# Patient Record
Sex: FEMALE | Race: ASIAN | NOT HISPANIC OR LATINO
[De-identification: names, ages, dates, MRNs, and addresses within clinical notes are randomized per-mention and may not be internally consistent; named-entity substitution may affect disease eponyms.]

---

## 2017-07-17 ENCOUNTER — APPOINTMENT (OUTPATIENT)
Dept: OPHTHALMOLOGY | Facility: CLINIC | Age: 62
End: 2017-07-17

## 2017-07-17 DIAGNOSIS — H17.9 UNSPECIFIED CORNEAL SCAR AND OPACITY: ICD-10-CM

## 2017-07-17 DIAGNOSIS — H26.9 UNSPECIFIED CATARACT: ICD-10-CM

## 2017-07-17 DIAGNOSIS — H16.302: ICD-10-CM

## 2017-07-17 DIAGNOSIS — H04.123 DRY EYE SYNDROME OF BILATERAL LACRIMAL GLANDS: ICD-10-CM

## 2017-07-17 DIAGNOSIS — H53.002 UNSPECIFIED AMBLYOPIA, LEFT EYE: ICD-10-CM

## 2018-10-03 ENCOUNTER — OUTPATIENT (OUTPATIENT)
Dept: OUTPATIENT SERVICES | Facility: HOSPITAL | Age: 63
LOS: 1 days | End: 2018-10-03
Payer: COMMERCIAL

## 2018-10-03 VITALS
SYSTOLIC BLOOD PRESSURE: 141 MMHG | WEIGHT: 164.91 LBS | HEART RATE: 93 BPM | DIASTOLIC BLOOD PRESSURE: 90 MMHG | TEMPERATURE: 98 F | HEIGHT: 63 IN | OXYGEN SATURATION: 97 %

## 2018-10-03 DIAGNOSIS — R94.39 ABNORMAL RESULT OF OTHER CARDIOVASCULAR FUNCTION STUDY: ICD-10-CM

## 2018-10-03 LAB
ANION GAP SERPL CALC-SCNC: 12 MMOL/L — SIGNIFICANT CHANGE UP (ref 5–17)
BUN SERPL-MCNC: 13 MG/DL — SIGNIFICANT CHANGE UP (ref 7–23)
CALCIUM SERPL-MCNC: 10 MG/DL — SIGNIFICANT CHANGE UP (ref 8.4–10.5)
CHLORIDE SERPL-SCNC: 107 MMOL/L — SIGNIFICANT CHANGE UP (ref 96–108)
CO2 SERPL-SCNC: 22 MMOL/L — SIGNIFICANT CHANGE UP (ref 22–31)
CREAT SERPL-MCNC: 0.62 MG/DL — SIGNIFICANT CHANGE UP (ref 0.5–1.3)
GLUCOSE SERPL-MCNC: 104 MG/DL — HIGH (ref 70–99)
HCT VFR BLD CALC: 40.5 % — SIGNIFICANT CHANGE UP (ref 34.5–45)
HGB BLD-MCNC: 14.1 G/DL — SIGNIFICANT CHANGE UP (ref 11.5–15.5)
MCHC RBC-ENTMCNC: 29.6 PG — SIGNIFICANT CHANGE UP (ref 27–34)
MCHC RBC-ENTMCNC: 34.8 GM/DL — SIGNIFICANT CHANGE UP (ref 32–36)
MCV RBC AUTO: 85 FL — SIGNIFICANT CHANGE UP (ref 80–100)
PLATELET # BLD AUTO: 241 K/UL — SIGNIFICANT CHANGE UP (ref 150–400)
POTASSIUM SERPL-MCNC: 4.1 MMOL/L — SIGNIFICANT CHANGE UP (ref 3.5–5.3)
POTASSIUM SERPL-SCNC: 4.1 MMOL/L — SIGNIFICANT CHANGE UP (ref 3.5–5.3)
RBC # BLD: 4.76 M/UL — SIGNIFICANT CHANGE UP (ref 3.8–5.2)
RBC # FLD: 11.5 % — SIGNIFICANT CHANGE UP (ref 10.3–14.5)
SODIUM SERPL-SCNC: 141 MMOL/L — SIGNIFICANT CHANGE UP (ref 135–145)
WBC # BLD: 6.7 K/UL — SIGNIFICANT CHANGE UP (ref 3.8–10.5)
WBC # FLD AUTO: 6.7 K/UL — SIGNIFICANT CHANGE UP (ref 3.8–10.5)

## 2018-10-03 PROCEDURE — 99152 MOD SED SAME PHYS/QHP 5/>YRS: CPT | Mod: GC

## 2018-10-03 PROCEDURE — C1887: CPT

## 2018-10-03 PROCEDURE — 93010 ELECTROCARDIOGRAM REPORT: CPT

## 2018-10-03 PROCEDURE — 93458 L HRT ARTERY/VENTRICLE ANGIO: CPT

## 2018-10-03 PROCEDURE — 93005 ELECTROCARDIOGRAM TRACING: CPT

## 2018-10-03 PROCEDURE — 80048 BASIC METABOLIC PNL TOTAL CA: CPT

## 2018-10-03 PROCEDURE — 99203 OFFICE O/P NEW LOW 30 MIN: CPT

## 2018-10-03 PROCEDURE — 93458 L HRT ARTERY/VENTRICLE ANGIO: CPT | Mod: 26,GC

## 2018-10-03 PROCEDURE — 85027 COMPLETE CBC AUTOMATED: CPT

## 2018-10-03 PROCEDURE — C1894: CPT

## 2018-10-03 PROCEDURE — C1769: CPT

## 2018-10-03 PROCEDURE — 99152 MOD SED SAME PHYS/QHP 5/>YRS: CPT

## 2018-10-03 RX ORDER — PANTOPRAZOLE SODIUM 20 MG/1
1 TABLET, DELAYED RELEASE ORAL
Qty: 0 | Refills: 0 | COMMUNITY

## 2018-10-03 RX ORDER — MESALAMINE 400 MG
0 TABLET, DELAYED RELEASE (ENTERIC COATED) ORAL
Qty: 0 | Refills: 0 | COMMUNITY

## 2018-10-03 RX ORDER — METOPROLOL TARTRATE 50 MG
1 TABLET ORAL
Qty: 0 | Refills: 0 | COMMUNITY

## 2018-10-03 NOTE — H&P CARDIOLOGY - HISTORY OF PRESENT ILLNESS
This is a 63 yo female with history of ulcerative colitis, HLD presents for outpatient cardiac cath possible intervention to evaluate exertional chest pain non radiating with walking and chores.  EF was 62% on Echo from 8/2018 with mild MR.  Pt also reports abnormal nuclear stress test 1 year ago, however did not go for further evaluation due to anxiety as her mother suffered a CVA post coronary angiogram. She also has concerns regarding anticoagulation with her ulcerative colitis.       Cardio- Dr. Dockery- [facility]

## 2018-10-04 PROBLEM — M17.0 PRIMARY OSTEOARTHRITIS OF BOTH KNEES: Status: ACTIVE | Noted: 2018-10-04

## 2018-10-08 ENCOUNTER — APPOINTMENT (OUTPATIENT)
Dept: ORTHOPEDIC SURGERY | Facility: CLINIC | Age: 63
End: 2018-10-08

## 2018-10-08 DIAGNOSIS — M17.0 BILATERAL PRIMARY OSTEOARTHRITIS OF KNEE: ICD-10-CM

## 2018-10-10 PROBLEM — K51.918 ULCERATIVE COLITIS, UNSPECIFIED WITH OTHER COMPLICATION: Chronic | Status: ACTIVE | Noted: 2018-10-03

## 2018-10-10 PROBLEM — E78.2 MIXED HYPERLIPIDEMIA: Chronic | Status: ACTIVE | Noted: 2018-10-03

## 2018-10-19 ENCOUNTER — APPOINTMENT (OUTPATIENT)
Dept: ORTHOPEDIC SURGERY | Facility: CLINIC | Age: 63
End: 2018-10-19

## 2020-09-30 ENCOUNTER — APPOINTMENT (OUTPATIENT)
Dept: VASCULAR SURGERY | Facility: CLINIC | Age: 65
End: 2020-09-30
Payer: COMMERCIAL

## 2020-09-30 VITALS
WEIGHT: 170 LBS | TEMPERATURE: 97.5 F | HEIGHT: 63 IN | BODY MASS INDEX: 30.12 KG/M2 | DIASTOLIC BLOOD PRESSURE: 74 MMHG | SYSTOLIC BLOOD PRESSURE: 117 MMHG | HEART RATE: 97 BPM

## 2020-09-30 DIAGNOSIS — M79.2 NEURALGIA AND NEURITIS, UNSPECIFIED: ICD-10-CM

## 2020-09-30 PROCEDURE — 93923 UPR/LXTR ART STDY 3+ LVLS: CPT

## 2020-09-30 PROCEDURE — 99243 OFF/OP CNSLTJ NEW/EST LOW 30: CPT

## 2020-12-15 ENCOUNTER — APPOINTMENT (OUTPATIENT)
Dept: OPHTHALMOLOGY | Facility: CLINIC | Age: 65
End: 2020-12-15

## 2021-02-27 ENCOUNTER — APPOINTMENT (OUTPATIENT)
Dept: DISASTER EMERGENCY | Facility: CLINIC | Age: 66
End: 2021-02-27

## 2021-02-27 ENCOUNTER — LABORATORY RESULT (OUTPATIENT)
Age: 66
End: 2021-02-27

## 2021-03-02 ENCOUNTER — APPOINTMENT (OUTPATIENT)
Dept: PULMONOLOGY | Facility: CLINIC | Age: 66
End: 2021-03-02
Payer: COMMERCIAL

## 2021-03-02 VITALS
OXYGEN SATURATION: 98 % | HEIGHT: 62 IN | BODY MASS INDEX: 30.36 KG/M2 | WEIGHT: 165 LBS | TEMPERATURE: 97.9 F | HEART RATE: 98 BPM

## 2021-03-02 VITALS
SYSTOLIC BLOOD PRESSURE: 116 MMHG | DIASTOLIC BLOOD PRESSURE: 76 MMHG | HEART RATE: 96 BPM | OXYGEN SATURATION: 99 % | TEMPERATURE: 97.3 F

## 2021-03-02 DIAGNOSIS — K51.90 ULCERATIVE COLITIS, UNSPECIFIED, W/OUT COMPLICATIONS: ICD-10-CM

## 2021-03-02 PROCEDURE — 99203 OFFICE O/P NEW LOW 30 MIN: CPT | Mod: 25

## 2021-03-02 PROCEDURE — 94726 PLETHYSMOGRAPHY LUNG VOLUMES: CPT

## 2021-03-02 PROCEDURE — 94010 BREATHING CAPACITY TEST: CPT

## 2021-03-02 PROCEDURE — 99072 ADDL SUPL MATRL&STAF TM PHE: CPT

## 2021-03-02 PROCEDURE — ZZZZZ: CPT

## 2021-03-02 PROCEDURE — 94729 DIFFUSING CAPACITY: CPT

## 2021-03-02 NOTE — PHYSICAL EXAM
[No Acute Distress] : no acute distress [Normal Appearance] : normal appearance [No Neck Mass] : no neck mass [Normal Rate/Rhythm] : normal rate/rhythm [Normal S1, S2] : normal s1, s2 [No Murmurs] : no murmurs [No Resp Distress] : no resp distress [Clear to Auscultation Bilaterally] : clear to auscultation bilaterally [No Abnormalities] : no abnormalities [No Clubbing] : no clubbing [No Edema] : no edema [Normal Color/ Pigmentation] : normal color/ pigmentation [No Focal Deficits] : no focal deficits [Oriented x3] : oriented x3 [Normal Affect] : normal affect

## 2021-03-02 NOTE — HISTORY OF PRESENT ILLNESS
[TextBox_4] : 65 year old woman with ulcerative colitis.  who is on mesalamine.  She denies all other PMH.  Son is present translating for her.  I recently diagnosed her  with active TB.  She had quant gold sent after that exposure and it was positive.  CXR taken on 2/16/21 was negative without any evidence of active TB.see scanned report.\par \par she feels well and denies all symptoms. Denies cough, sputum productionj weight loss or night sweats.  is being treated for TB and monitored by JAZLYN.

## 2021-03-02 NOTE — ASSESSMENT
[FreeTextEntry1] : 65 year old woman with history of ulcerative colitis on mesalamine, with latent TB infection.  Her  was recently diagnosed with active TB and is my patient.  She has no symptoms.\par \par Quantiferon gold was positive- son will send me report.\par CXR was clear without I/E\par \par ON PE VSS and lugns are clear.\par \par PFTs are normal\par \par IMpression:  Latent TB infection, with high risk exposure ( )\par \par Plan:\par 1- Rifampin 600mg daily for 4 months.  \par 2- check cbc and  comprehensive metabolic panel today and monthly.\par 3- advised not to start medication until I review her labs.\par F/U in one monht.

## 2021-03-04 LAB
ALBUMIN SERPL ELPH-MCNC: 4.5 G/DL
ALP BLD-CCNC: 116 U/L
ALT SERPL-CCNC: 13 U/L
ANION GAP SERPL CALC-SCNC: 11 MMOL/L
AST SERPL-CCNC: 18 U/L
BASOPHILS # BLD AUTO: 0.06 K/UL
BASOPHILS NFR BLD AUTO: 0.7 %
BILIRUB SERPL-MCNC: <0.2 MG/DL
BUN SERPL-MCNC: 17 MG/DL
CALCIUM SERPL-MCNC: 9.9 MG/DL
CHLORIDE SERPL-SCNC: 106 MMOL/L
CO2 SERPL-SCNC: 24 MMOL/L
CREAT SERPL-MCNC: 0.7 MG/DL
EOSINOPHIL # BLD AUTO: 0.19 K/UL
EOSINOPHIL NFR BLD AUTO: 2.2 %
GLUCOSE SERPL-MCNC: 108 MG/DL
HCT VFR BLD CALC: 41.9 %
HGB BLD-MCNC: 13.4 G/DL
IMM GRANULOCYTES NFR BLD AUTO: 0.2 %
LYMPHOCYTES # BLD AUTO: 2.68 K/UL
LYMPHOCYTES NFR BLD AUTO: 30.7 %
MAN DIFF?: NORMAL
MCHC RBC-ENTMCNC: 28.7 PG
MCHC RBC-ENTMCNC: 32 GM/DL
MCV RBC AUTO: 89.7 FL
MONOCYTES # BLD AUTO: 0.71 K/UL
MONOCYTES NFR BLD AUTO: 8.1 %
NEUTROPHILS # BLD AUTO: 5.07 K/UL
NEUTROPHILS NFR BLD AUTO: 58.1 %
PLATELET # BLD AUTO: 331 K/UL
POTASSIUM SERPL-SCNC: 4.3 MMOL/L
PROT SERPL-MCNC: 7.5 G/DL
RBC # BLD: 4.67 M/UL
RBC # FLD: 12.2 %
SODIUM SERPL-SCNC: 141 MMOL/L
WBC # FLD AUTO: 8.73 K/UL

## 2021-03-26 ENCOUNTER — RX RENEWAL (OUTPATIENT)
Age: 66
End: 2021-03-26

## 2021-04-08 ENCOUNTER — APPOINTMENT (OUTPATIENT)
Dept: PULMONOLOGY | Facility: CLINIC | Age: 66
End: 2021-04-08
Payer: COMMERCIAL

## 2021-04-08 VITALS
HEART RATE: 94 BPM | DIASTOLIC BLOOD PRESSURE: 77 MMHG | OXYGEN SATURATION: 98 % | TEMPERATURE: 96.7 F | WEIGHT: 165 LBS | BODY MASS INDEX: 30.36 KG/M2 | SYSTOLIC BLOOD PRESSURE: 109 MMHG | HEIGHT: 62 IN

## 2021-04-08 PROCEDURE — 99072 ADDL SUPL MATRL&STAF TM PHE: CPT

## 2021-04-08 PROCEDURE — 99213 OFFICE O/P EST LOW 20 MIN: CPT

## 2021-04-08 NOTE — HISTORY OF PRESENT ILLNESS
[TextBox_4] : 65 year old woman with ulcerative colitis.  who is on mesalamine.  She denies all other PMH.  Son is present translating for her.  I recently diagnosed her  with active TB.  She had quant gold sent after that exposure and it was positive.  CXR taken on 2/16/21 was negative without any evidence of active TB.see scanned report.\par \par she was started on Rifampin in early March.  She refilled her medication on 3/26/21 \par \par Returns for follow up today.  Accompanied by .  She feels well.  Denies any nausea, vomiting abdominal pain.  no fever sweats, slight occasional cough.

## 2021-04-08 NOTE — ASSESSMENT
[FreeTextEntry1] : 65 year old woman with history of ulcerative colitis on mesalamine, with latent TB infection.  Her  was recently diagnosed with active TB and is my patient.  She has no symptoms.\par Quantiferon gold was positive.\par CXR was clear without I/E\par Returns for follow up.  She feels well on the Rifampin.  denies nausea or vomiting. \par \par ON PE VSS and lugns are clear. Abdomen is soft and nontender.  \par \par PFTs are normal\par \par IMpression:  Latent TB infection, with high risk exposure ( )Tolerating treatment with rifampin for LTBI.  Will take for total of 4 months.  \par \par Plan:\par 1- Continue Rifampin 600mg.  \par 2- check cbc and  comprehensive metabolic panel today and monthly.\par 3- I prescribed additional month (May).  Will have labs checked after 5/15/- after her FAST is over.\par 4- Follow up in June.

## 2021-04-08 NOTE — PHYSICAL EXAM
[No Acute Distress] : no acute distress [Normal Oropharynx] : normal oropharynx [Normal Appearance] : normal appearance [No Neck Mass] : no neck mass [Normal Rate/Rhythm] : normal rate/rhythm [Normal S1, S2] : normal s1, s2 [No Murmurs] : no murmurs [No Resp Distress] : no resp distress [Clear to Auscultation Bilaterally] : clear to auscultation bilaterally [No Abnormalities] : no abnormalities [Benign] : benign [Not Tender] : not tender [Normal Gait] : normal gait [No Clubbing] : no clubbing [No Cyanosis] : no cyanosis [No Edema] : no edema [FROM] : FROM [Normal Color/ Pigmentation] : normal color/ pigmentation [No Focal Deficits] : no focal deficits [Oriented x3] : oriented x3 [Normal Affect] : normal affect

## 2021-04-09 LAB
ALBUMIN SERPL ELPH-MCNC: 4.4 G/DL
ALP BLD-CCNC: 109 U/L
ALT SERPL-CCNC: 25 U/L
ANION GAP SERPL CALC-SCNC: 11 MMOL/L
AST SERPL-CCNC: 21 U/L
BASOPHILS # BLD AUTO: 0.05 K/UL
BASOPHILS NFR BLD AUTO: 0.9 %
BILIRUB SERPL-MCNC: 0.2 MG/DL
BUN SERPL-MCNC: 14 MG/DL
CALCIUM SERPL-MCNC: 10.2 MG/DL
CHLORIDE SERPL-SCNC: 105 MMOL/L
CO2 SERPL-SCNC: 24 MMOL/L
CREAT SERPL-MCNC: 0.66 MG/DL
EOSINOPHIL # BLD AUTO: 0.18 K/UL
EOSINOPHIL NFR BLD AUTO: 3.1 %
GLUCOSE SERPL-MCNC: 102 MG/DL
HCT VFR BLD CALC: 41.4 %
HGB BLD-MCNC: 13.6 G/DL
IMM GRANULOCYTES NFR BLD AUTO: 0.3 %
LYMPHOCYTES # BLD AUTO: 1.86 K/UL
LYMPHOCYTES NFR BLD AUTO: 31.8 %
MAN DIFF?: NORMAL
MCHC RBC-ENTMCNC: 28.5 PG
MCHC RBC-ENTMCNC: 32.9 GM/DL
MCV RBC AUTO: 86.6 FL
MONOCYTES # BLD AUTO: 0.44 K/UL
MONOCYTES NFR BLD AUTO: 7.5 %
NEUTROPHILS # BLD AUTO: 3.29 K/UL
NEUTROPHILS NFR BLD AUTO: 56.4 %
PLATELET # BLD AUTO: 216 K/UL
POTASSIUM SERPL-SCNC: 4 MMOL/L
PROT SERPL-MCNC: 7.3 G/DL
RBC # BLD: 4.78 M/UL
RBC # FLD: 12.9 %
SODIUM SERPL-SCNC: 140 MMOL/L
WBC # FLD AUTO: 5.84 K/UL

## 2021-05-07 ENCOUNTER — APPOINTMENT (OUTPATIENT)
Dept: PULMONOLOGY | Facility: CLINIC | Age: 66
End: 2021-05-07
Payer: COMMERCIAL

## 2021-05-07 ENCOUNTER — LABORATORY RESULT (OUTPATIENT)
Age: 66
End: 2021-05-07

## 2021-05-07 PROCEDURE — 99072 ADDL SUPL MATRL&STAF TM PHE: CPT

## 2021-05-07 PROCEDURE — 36415 COLL VENOUS BLD VENIPUNCTURE: CPT

## 2021-05-21 ENCOUNTER — RX RENEWAL (OUTPATIENT)
Age: 66
End: 2021-05-21

## 2021-05-27 ENCOUNTER — APPOINTMENT (OUTPATIENT)
Dept: PULMONOLOGY | Facility: CLINIC | Age: 66
End: 2021-05-27
Payer: COMMERCIAL

## 2021-05-27 VITALS
BODY MASS INDEX: 30.36 KG/M2 | WEIGHT: 165 LBS | HEIGHT: 62 IN | SYSTOLIC BLOOD PRESSURE: 133 MMHG | HEART RATE: 79 BPM | RESPIRATION RATE: 16 BRPM | DIASTOLIC BLOOD PRESSURE: 81 MMHG | TEMPERATURE: 97.7 F

## 2021-05-27 DIAGNOSIS — Z51.81 ENCOUNTER FOR THERAPEUTIC DRUG LVL MONITORING: ICD-10-CM

## 2021-05-27 DIAGNOSIS — Z22.7 LATENT TUBERCULOSIS: ICD-10-CM

## 2021-05-27 PROCEDURE — 99072 ADDL SUPL MATRL&STAF TM PHE: CPT

## 2021-05-27 PROCEDURE — 99214 OFFICE O/P EST MOD 30 MIN: CPT

## 2021-05-27 NOTE — ASSESSMENT
[FreeTextEntry1] : 65 year old woman with history of ulcerative colitis on mesalamine, with latent TB infection.  Her  was recently diagnosed with active TB and is my patient.  She has no symptoms.\par Quantiferon gold was positive.\par CXR was clear without I/E\par Returns for follow up.  She feels well on the Rifampin- has completed 3 months..  denies nausea or vomiting. \par \par ON PE VSS and lugns are clear. Abdomen is soft and nontender.  \par \par PFTs are normal\par \par IMpression:  Latent TB infection, with high risk exposure ( )Tolerating treatment with rifampin for LTBI.  Will take for total of 4 months.  \par \par Plan:\par 1- Continue Rifampin 600mg for one more month\par To complete therapy after one additional month of treatment.  I ordered Rifampin today.\par Labs in May were WNL.\par Advised to call me if she develops nauisea, abdominal pain and to stop the medication if this should happen.

## 2021-05-27 NOTE — HISTORY OF PRESENT ILLNESS
[TextBox_4] : 65 year old woman with ulcerative colitis.  who is on mesalamine.  She denies all other PMH.  Son is present translating for her.  I recently diagnosed her  with active TB.  She had quant gold sent after that exposure and it was positive.  CXR taken on 2/16/21 was negative without any evidence of active TB.see scanned report.\par \par she was started on Rifampin in early March.  She refilled her medication on 3/26/21 \par \par Returns for follow up today.  Accompanied by daughter She feels well.  Denies any nausea, vomiting abdominal pain.  no fever sweats, slight or  cough.  Has completed 3 months of rifampin.

## 2022-02-10 ENCOUNTER — APPOINTMENT (OUTPATIENT)
Dept: OTOLARYNGOLOGY | Facility: CLINIC | Age: 67
End: 2022-02-10
Payer: COMMERCIAL

## 2022-02-10 DIAGNOSIS — Z78.9 OTHER SPECIFIED HEALTH STATUS: ICD-10-CM

## 2022-02-10 DIAGNOSIS — Z80.0 FAMILY HISTORY OF MALIGNANT NEOPLASM OF DIGESTIVE ORGANS: ICD-10-CM

## 2022-02-10 DIAGNOSIS — M26.609 UNSPECIFIED TEMPOROMANDIBULAR JOINT DISORDER: ICD-10-CM

## 2022-02-10 PROCEDURE — 99203 OFFICE O/P NEW LOW 30 MIN: CPT

## 2022-02-10 RX ORDER — MESALAMINE 400 MG
TABLET, DELAYED RELEASE (ENTERIC COATED) ORAL
Refills: 0 | Status: ACTIVE | COMMUNITY

## 2022-02-10 RX ORDER — RIFAMPIN 300 MG/1
300 CAPSULE ORAL
Qty: 60 | Refills: 0 | Status: COMPLETED | COMMUNITY
Start: 2021-03-02 | End: 2022-02-10

## 2022-02-10 RX ORDER — MESALAMINE
POWDER (GRAM) MISCELLANEOUS
Refills: 0 | Status: COMPLETED | COMMUNITY
End: 2022-02-10

## 2022-02-10 NOTE — REASON FOR VISIT
[Initial Consultation] : an initial consultation for [Patient Declined  Services] : - None: Patient declined  services [FreeTextEntry2] : referred by Dr. Bret Nunez, PCP for intermittent bilateral otalgia.  [FreeTextEntry3] : Patient declined offer of translation service. Patient preferred to use accompanying family member/friend for translation.

## 2022-02-10 NOTE — ASSESSMENT
[FreeTextEntry1] : 66 year old female with bilateral otalgia likely secondary to TMJ arthalgia. Recommended soft diet, warm compresses, and NSAIDs. Avoid hard candy, chewing gum, chewing steak, chicken, breads. These will cause more trauma and inflammation of the joint.

## 2022-02-10 NOTE — HISTORY OF PRESENT ILLNESS
[de-identified] : 66 year old female referred by Dr. Bret Nunez, PCP for intermittent bilateral otalgia with headaches. Son reports bilateral otalgia for about 1 year, has been using Ashlyn's ear ache drops with relief in the past, but not anymore. States had left ear infection in 11/2021, treated with 5 day course antibiotics and resolved. Denies otorrhea, hearing loss, tinnitus, dizziness, vertigo. Denies CT or MRI.

## 2022-02-10 NOTE — CONSULT LETTER
[Dear  ___] : Dear  [unfilled], [Consult Letter:] : I had the pleasure of evaluating your patient, [unfilled]. [Please see my note below.] : Please see my note below. [Consult Closing:] : Thank you very much for allowing me to participate in the care of this patient.  If you have any questions, please do not hesitate to contact me. [Sincerely,] : Sincerely, [FreeTextEntry2] : Bret Nunez MD\par [address], [place] [address]\par [phone] [FreeTextEntry3] : Xiomy Talamantes MD\par Facial Plastic & Reconstructive Surgery\par Department of Otolaryngology\par [address]\par [place], [place]\par [phone]\par \par [address] [facility] [address]\[place] [address]\par [phone]

## 2023-11-08 ENCOUNTER — NON-APPOINTMENT (OUTPATIENT)
Age: 68
End: 2023-11-08

## 2023-11-13 ENCOUNTER — EMERGENCY (EMERGENCY)
Facility: HOSPITAL | Age: 68
LOS: 1 days | Discharge: HOME | End: 2023-11-13
Attending: EMERGENCY MEDICINE | Admitting: EMERGENCY MEDICINE
Payer: COMMERCIAL

## 2023-11-13 ENCOUNTER — TRANSCRIPTION ENCOUNTER (OUTPATIENT)
Age: 68
End: 2023-11-13

## 2023-11-13 VITALS
DIASTOLIC BLOOD PRESSURE: 65 MMHG | SYSTOLIC BLOOD PRESSURE: 138 MMHG | OXYGEN SATURATION: 99 % | HEART RATE: 88 BPM | RESPIRATION RATE: 18 BRPM

## 2023-11-13 VITALS
RESPIRATION RATE: 18 BRPM | SYSTOLIC BLOOD PRESSURE: 143 MMHG | HEART RATE: 82 BPM | OXYGEN SATURATION: 100 % | TEMPERATURE: 98 F | DIASTOLIC BLOOD PRESSURE: 60 MMHG

## 2023-11-13 PROCEDURE — 99284 EMERGENCY DEPT VISIT MOD MDM: CPT

## 2023-11-13 PROCEDURE — 93971 EXTREMITY STUDY: CPT | Mod: 26,RT

## 2023-11-13 PROCEDURE — 73562 X-RAY EXAM OF KNEE 3: CPT | Mod: 26,RT

## 2023-11-13 RX ORDER — KETOROLAC TROMETHAMINE 30 MG/ML
15 SYRINGE (ML) INJECTION ONCE
Refills: 0 | Status: DISCONTINUED | OUTPATIENT
Start: 2023-11-13 | End: 2023-11-13

## 2023-11-13 RX ORDER — ACETAMINOPHEN 500 MG
975 TABLET ORAL ONCE
Refills: 0 | Status: COMPLETED | OUTPATIENT
Start: 2023-11-13 | End: 2023-11-13

## 2023-11-13 RX ADMIN — Medication 975 MILLIGRAM(S): at 07:53

## 2023-11-13 RX ADMIN — Medication 15 MILLIGRAM(S): at 07:53

## 2023-11-13 NOTE — ED PROVIDER NOTE - CLINICAL SUMMARY MEDICAL DECISION MAKING FREE TEXT BOX
67-year-old female with a history of arthritis presents to the ED with atraumatic knee pain and swelling.  Suspect the patient has a flare of osteoarthritis.  However given swelling and prominence of superficial veins will obtain duplex ultrasound to evaluate for DVT.  Will obtain x-ray to further evaluate for signs of osteoarthritis. Suspect imaging studies will be negative and patient will be discharged to follow with her PMD Akash att: 67-year-old female with a history of arthritis presents to the ED with atraumatic knee pain and swelling.  Suspect the patient has a flare of osteoarthritis.  However given swelling and prominence of superficial veins will obtain duplex ultrasound to evaluate for DVT.  Will obtain x-ray to further evaluate for signs of osteoarthritis. Suspect imaging studies will be negative and patient will be discharged to follow with her PMD

## 2023-11-13 NOTE — ED ADULT NURSE NOTE - OBJECTIVE STATEMENT
A&Ox4. Past medical history of HLD. Presents with c/o right leg pain x 2 weeks after twisting her hip while pushing a shopping cart . Denies CP, SOB or N/V/D. Respirations even and unlabored. Labs obtained. Medications given as per current care plan. Awaiting diagnostic testing. Safety precautions in place.

## 2023-11-13 NOTE — ED PROVIDER NOTE - NSFOLLOWUPINSTRUCTIONS_ED_ALL_ED_FT
You were seen in the ER after an injury    Your XR did not show signs of a broken bone. Your ultrasound did not show signs of a blood clot.    Take Tylenol 975mg every 8 hours for the next 3 days for pain. You can also use an over the counter lidocaine patch and diclofenac gel. Although we are sending you home, no ER evaluation is complete without outpatient follow-up. Follow up with your regular doctor within the next 1 week. Return to the ER for worsening symptoms, worsening pain/swelling, numbness/tingling

## 2023-11-13 NOTE — ED PROVIDER NOTE - OBJECTIVE STATEMENT
67-year-old female with osteoarthritis presents to the ED with approximately 1 month of right knee pain and swelling.  Patient notes a longstanding history of pain in her right ankle, knee,  and hip she has attributed to arthritis pain.  She saw her primary care doctor 2 weeks ago who suspected that pain was due to arthritis, however an ultrasound was ordered due to swelling on the right leg.  Her pain has persisted and the ultrasound is scheduled for 3 days from now so they presented to the ED.  She has not had any recent injury or falls although she did note that using a shopping cart if the pain worse.  She has been able to walk.  No fevers.  Has been taking meloxicam and Tylenol for pain which has not been helping.

## 2023-11-13 NOTE — ED ADULT NURSE NOTE - NSFALLUNIVINTERV_ED_ALL_ED
Bed/Stretcher in lowest position, wheels locked, appropriate side rails in place/Call bell, personal items and telephone in reach/Instruct patient to call for assistance before getting out of bed/chair/stretcher/Non-slip footwear applied when patient is off stretcher/[place] to call system/Physically safe environment - no spills, clutter or unnecessary equipment/Purposeful proactive rounding/Room/bathroom lighting operational, light cord in reach

## 2023-11-13 NOTE — ED ADULT TRIAGE NOTE - NS ED NURSE BANDS TYPE
-I will go ahead and get an MRI of the neck since patient's symptoms are more regular in nature going to her left hand.  -offered Mobic however patient reluctant to take medications  -currently taking Tylenol and ibuprofen and will continue to alternate  -heat/ice application and neck exercises have been discussed in detail  
Name band;

## 2023-11-13 NOTE — ED ADULT TRIAGE NOTE - CHIEF COMPLAINT QUOTE
c/o left leg pain x 2 weeks after twisting her hip while pushing a shopping cart . Denies hx c/o right leg pain x 2 weeks after twisting her hip while pushing a shopping cart . Denies hx

## 2023-11-13 NOTE — ED PROVIDER NOTE - PHYSICAL EXAMINATION
CONSTITUTIONAL: Well appearing, awake, alert  ENMT: Airway patent, tolerating secretions.    NECK: Supple. No JVD  EYES: Clear bilaterally, pupils equal, round  CARDIAC: Warm, well-perfused  RESPIRATORY: Equal Chest Rise, no stridor. No respiratory distress.   GASTROINTESTINAL: Non-distended  MUSCULOSKELETAL: No gross joint deformity   Pelvis stable and non-tender.   R Hip Flexion/Extension, 5/5. Non-tender to palpation.   R Knee: mild tenderness over medial condyle. Flexion/extension 5/5.   R Ankle mild swelling on lateral malleolus. Non-tender. Flexion/extension 5/5   dp/pt pulses 2+  prominence of superficial veins throughout RLE which seems slightly increased compared to left   NEUROLOGICAL: Alert. Speech Fluent. Attends to conversation and exam  SKIN:  No erythema    PSYCHIATRIC: Normal affect. Cooperative with history and exam

## 2023-11-13 NOTE — ED PROVIDER NOTE - PATIENT PORTAL LINK FT
You can access the FollowMyHealth Patient Portal offered by [facility] by registering at the following website: [email or web address]. By joining [employer]’s FollowMyHealth portal, you will also be able to view your health information using other applications (apps) compatible with our system.

## 2023-11-15 ENCOUNTER — NON-APPOINTMENT (OUTPATIENT)
Age: 68
End: 2023-11-15

## 2023-11-16 ENCOUNTER — NON-APPOINTMENT (OUTPATIENT)
Age: 68
End: 2023-11-16

## 2023-11-16 ENCOUNTER — APPOINTMENT (OUTPATIENT)
Dept: ORTHOPEDIC SURGERY | Facility: CLINIC | Age: 68
End: 2023-11-16
Payer: COMMERCIAL

## 2023-11-16 VITALS
HEIGHT: 63 IN | SYSTOLIC BLOOD PRESSURE: 106 MMHG | HEART RATE: 93 BPM | BODY MASS INDEX: 30.48 KG/M2 | WEIGHT: 172 LBS | DIASTOLIC BLOOD PRESSURE: 67 MMHG

## 2023-11-16 DIAGNOSIS — M17.11 UNILATERAL PRIMARY OSTEOARTHRITIS, RIGHT KNEE: ICD-10-CM

## 2023-11-16 PROCEDURE — 73564 X-RAY EXAM KNEE 4 OR MORE: CPT | Mod: RT

## 2023-11-16 PROCEDURE — 99204 OFFICE O/P NEW MOD 45 MIN: CPT | Mod: 25

## 2023-11-16 PROCEDURE — 20610 DRAIN/INJ JOINT/BURSA W/O US: CPT | Mod: RT

## 2024-03-11 NOTE — ED PROVIDER NOTE - CHIEF COMPLAINT
Spine appears normal, range of motion is not limited, no muscle or joint tenderness The patient is a 67y Female complaining of

## 2024-06-20 ENCOUNTER — APPOINTMENT (OUTPATIENT)
Dept: ORTHOPEDIC SURGERY | Facility: CLINIC | Age: 69
End: 2024-06-20

## 2024-12-17 ENCOUNTER — NON-APPOINTMENT (OUTPATIENT)
Age: 69
End: 2024-12-17

## 2024-12-18 ENCOUNTER — APPOINTMENT (OUTPATIENT)
Dept: ORTHOPEDIC SURGERY | Facility: CLINIC | Age: 69
End: 2024-12-18
Payer: MEDICARE

## 2024-12-18 VITALS
BODY MASS INDEX: 30.12 KG/M2 | SYSTOLIC BLOOD PRESSURE: 112 MMHG | DIASTOLIC BLOOD PRESSURE: 74 MMHG | OXYGEN SATURATION: 94 % | HEIGHT: 63 IN | WEIGHT: 170 LBS | HEART RATE: 110 BPM

## 2024-12-18 DIAGNOSIS — M17.0 BILATERAL PRIMARY OSTEOARTHRITIS OF KNEE: ICD-10-CM

## 2024-12-18 PROCEDURE — 99203 OFFICE O/P NEW LOW 30 MIN: CPT

## 2025-02-19 ENCOUNTER — APPOINTMENT (OUTPATIENT)
Dept: ORTHOPEDIC SURGERY | Facility: CLINIC | Age: 70
End: 2025-02-19

## 2025-02-28 ENCOUNTER — NON-APPOINTMENT (OUTPATIENT)
Age: 70
End: 2025-02-28

## 2025-03-05 ENCOUNTER — APPOINTMENT (OUTPATIENT)
Dept: VASCULAR SURGERY | Facility: CLINIC | Age: 70
End: 2025-03-05

## 2025-03-07 ENCOUNTER — APPOINTMENT (OUTPATIENT)
Dept: ORTHOPEDIC SURGERY | Facility: CLINIC | Age: 70
End: 2025-03-07

## 2025-03-12 ENCOUNTER — APPOINTMENT (OUTPATIENT)
Dept: ORTHOPEDIC SURGERY | Facility: CLINIC | Age: 70
End: 2025-03-12

## 2025-05-15 ENCOUNTER — APPOINTMENT (OUTPATIENT)
Dept: ORTHOPEDIC SURGERY | Facility: CLINIC | Age: 70
End: 2025-05-15
Payer: MEDICARE

## 2025-05-15 VITALS — BODY MASS INDEX: 30.12 KG/M2 | WEIGHT: 170 LBS | HEIGHT: 63 IN

## 2025-05-15 DIAGNOSIS — M17.0 BILATERAL PRIMARY OSTEOARTHRITIS OF KNEE: ICD-10-CM

## 2025-05-15 PROCEDURE — 99215 OFFICE O/P EST HI 40 MIN: CPT | Mod: 25

## 2025-05-15 PROCEDURE — 73562 X-RAY EXAM OF KNEE 3: CPT | Mod: LT,RT

## 2025-05-29 ENCOUNTER — OUTPATIENT (OUTPATIENT)
Dept: OUTPATIENT SERVICES | Facility: HOSPITAL | Age: 70
LOS: 1 days | End: 2025-05-29

## 2025-05-29 VITALS
RESPIRATION RATE: 16 BRPM | WEIGHT: 160.06 LBS | HEIGHT: 61.5 IN | DIASTOLIC BLOOD PRESSURE: 80 MMHG | SYSTOLIC BLOOD PRESSURE: 132 MMHG | TEMPERATURE: 98 F | OXYGEN SATURATION: 99 % | HEART RATE: 78 BPM

## 2025-05-29 DIAGNOSIS — M17.11 UNILATERAL PRIMARY OSTEOARTHRITIS, RIGHT KNEE: ICD-10-CM

## 2025-05-29 DIAGNOSIS — Z98.890 OTHER SPECIFIED POSTPROCEDURAL STATES: Chronic | ICD-10-CM

## 2025-05-29 PROBLEM — K51.918 ULCERATIVE COLITIS, UNSPECIFIED WITH OTHER COMPLICATION: Chronic | Status: INACTIVE | Noted: 2018-10-03 | Resolved: 2025-05-29

## 2025-05-29 LAB
A1C WITH ESTIMATED AVERAGE GLUCOSE RESULT: 5.1 % — SIGNIFICANT CHANGE UP (ref 4–5.6)
ANION GAP SERPL CALC-SCNC: 11 MMOL/L — SIGNIFICANT CHANGE UP (ref 7–14)
APPEARANCE UR: CLEAR — SIGNIFICANT CHANGE UP
BASOPHILS # BLD AUTO: 0.06 K/UL — SIGNIFICANT CHANGE UP (ref 0–0.2)
BASOPHILS NFR BLD AUTO: 1 % — SIGNIFICANT CHANGE UP (ref 0–2)
BILIRUB UR-MCNC: NEGATIVE — SIGNIFICANT CHANGE UP
BLD GP AB SCN SERPL QL: NEGATIVE — SIGNIFICANT CHANGE UP
BUN SERPL-MCNC: 17 MG/DL — SIGNIFICANT CHANGE UP (ref 7–23)
CALCIUM SERPL-MCNC: 10 MG/DL — SIGNIFICANT CHANGE UP (ref 8.4–10.5)
CHLORIDE SERPL-SCNC: 106 MMOL/L — SIGNIFICANT CHANGE UP (ref 98–107)
CO2 SERPL-SCNC: 22 MMOL/L — SIGNIFICANT CHANGE UP (ref 22–31)
COLOR SPEC: YELLOW — SIGNIFICANT CHANGE UP
CREAT SERPL-MCNC: 0.58 MG/DL — SIGNIFICANT CHANGE UP (ref 0.5–1.3)
DIFF PNL FLD: ABNORMAL
EGFR: 99 ML/MIN/1.73M2 — SIGNIFICANT CHANGE UP
EGFR: 99 ML/MIN/1.73M2 — SIGNIFICANT CHANGE UP
EOSINOPHIL # BLD AUTO: 0.15 K/UL — SIGNIFICANT CHANGE UP (ref 0–0.5)
EOSINOPHIL NFR BLD AUTO: 2.4 % — SIGNIFICANT CHANGE UP (ref 0–6)
ESTIMATED AVERAGE GLUCOSE: 100 — SIGNIFICANT CHANGE UP
GLUCOSE SERPL-MCNC: 94 MG/DL — SIGNIFICANT CHANGE UP (ref 70–99)
GLUCOSE UR QL: NEGATIVE MG/DL — SIGNIFICANT CHANGE UP
HCT VFR BLD CALC: 37.8 % — SIGNIFICANT CHANGE UP (ref 34.5–45)
HGB BLD-MCNC: 12.5 G/DL — SIGNIFICANT CHANGE UP (ref 11.5–15.5)
IMM GRANULOCYTES NFR BLD AUTO: 0.3 % — SIGNIFICANT CHANGE UP (ref 0–0.9)
KETONES UR QL: NEGATIVE MG/DL — SIGNIFICANT CHANGE UP
LEUKOCYTE ESTERASE UR-ACNC: NEGATIVE — SIGNIFICANT CHANGE UP
LYMPHOCYTES # BLD AUTO: 2.35 K/UL — SIGNIFICANT CHANGE UP (ref 1–3.3)
LYMPHOCYTES # BLD AUTO: 37.3 % — SIGNIFICANT CHANGE UP (ref 13–44)
MCHC RBC-ENTMCNC: 28.6 PG — SIGNIFICANT CHANGE UP (ref 27–34)
MCHC RBC-ENTMCNC: 33.1 G/DL — SIGNIFICANT CHANGE UP (ref 32–36)
MCV RBC AUTO: 86.5 FL — SIGNIFICANT CHANGE UP (ref 80–100)
MONOCYTES # BLD AUTO: 0.58 K/UL — SIGNIFICANT CHANGE UP (ref 0–0.9)
MONOCYTES NFR BLD AUTO: 9.2 % — SIGNIFICANT CHANGE UP (ref 2–14)
MRSA PCR RESULT.: SIGNIFICANT CHANGE UP
NEUTROPHILS # BLD AUTO: 3.14 K/UL — SIGNIFICANT CHANGE UP (ref 1.8–7.4)
NEUTROPHILS NFR BLD AUTO: 49.8 % — SIGNIFICANT CHANGE UP (ref 43–77)
NITRITE UR-MCNC: NEGATIVE — SIGNIFICANT CHANGE UP
PH UR: 6.5 — SIGNIFICANT CHANGE UP (ref 5–8)
PLATELET # BLD AUTO: 266 K/UL — SIGNIFICANT CHANGE UP (ref 150–400)
POTASSIUM SERPL-MCNC: 3.9 MMOL/L — SIGNIFICANT CHANGE UP (ref 3.5–5.3)
POTASSIUM SERPL-SCNC: 3.9 MMOL/L — SIGNIFICANT CHANGE UP (ref 3.5–5.3)
PROT UR-MCNC: NEGATIVE MG/DL — SIGNIFICANT CHANGE UP
RBC # BLD: 4.37 M/UL — SIGNIFICANT CHANGE UP (ref 3.8–5.2)
RBC # FLD: 12.4 % — SIGNIFICANT CHANGE UP (ref 10.3–14.5)
RH IG SCN BLD-IMP: POSITIVE — SIGNIFICANT CHANGE UP
RH IG SCN BLD-IMP: POSITIVE — SIGNIFICANT CHANGE UP
S AUREUS DNA NOSE QL NAA+PROBE: SIGNIFICANT CHANGE UP
SODIUM SERPL-SCNC: 139 MMOL/L — SIGNIFICANT CHANGE UP (ref 135–145)
SP GR SPEC: 1.01 — SIGNIFICANT CHANGE UP (ref 1–1.03)
UROBILINOGEN FLD QL: 0.2 MG/DL — SIGNIFICANT CHANGE UP (ref 0.2–1)
WBC # BLD: 6.3 K/UL — SIGNIFICANT CHANGE UP (ref 3.8–10.5)
WBC # FLD AUTO: 6.3 K/UL — SIGNIFICANT CHANGE UP (ref 3.8–10.5)

## 2025-05-29 RX ORDER — SODIUM CHLORIDE 9 G/1000ML
1000 INJECTION, SOLUTION INTRAVENOUS
Refills: 0 | Status: DISCONTINUED | OUTPATIENT
Start: 2025-06-18 | End: 2025-06-19

## 2025-05-29 RX ORDER — TRAMADOL HYDROCHLORIDE 50 MG/1
50 TABLET, FILM COATED ORAL ONCE
Refills: 0 | Status: DISCONTINUED | OUTPATIENT
Start: 2025-06-18 | End: 2025-06-18

## 2025-05-29 NOTE — H&P PST ADULT - HISTORY OF PRESENT ILLNESS
69 year old female with c/o right kneearthritis and pain x 1 year. Pt presents today for presurgical evaluation for .... 69 year old female with c/o right knee arthritis and pain x 1 year. Pt presents today for presurgical evaluation for Right Total Knee Arthroplasty - Robotic Assisted with Leonid.

## 2025-05-29 NOTE — H&P PST ADULT - NSANTHGENDERRD_ENT_A_CORE
Over the counter medications:  -Take Ibuprofen 600-800 mg every 6-8 hours OR Aleve/naproxen every 12 hours as needed for pain and inflammation. Take with food.     -Take Tylenol 975 mg every 8 hours as needed for additional pain relief.       Other things to try:   -Muscle creams such as biofreeze to apply to chest   -Rest the area   -Ice or heat for maximum of 20 minutes at one time.  -Support pillows  -Epsom salt soaks      If fractured please increase in your diet :  -Calcium,   -Vitamin D   -Protein     Ordered from Pharmacy  - Start Flexeril muscle relaxer  5-10 mg every 8 hours as need for muscle spasms.  This medication causes sedation and it should not be taken when working, using power tools, or using motor vehicles.  
No

## 2025-05-29 NOTE — H&P PST ADULT - GASTROINTESTINAL COMMENTS
hx ulcerative colitis - not currently on medication hx ulcerative colitis - denies any symptoms currently, not currently on medication

## 2025-05-29 NOTE — H&P PST ADULT - PAIN SITE
[___ Weeks Post Op] : [unfilled] weeks post op [Xray (Date:___)] : [unfilled] Xray -  [Doing Well] : is doing well [No Sign of Infection] : is showing no signs of infection [Adequate Pain Control] : has adequate pain control [de-identified] : SPO Left clavicle ORIF DOS: 2/21/24 [de-identified] : Patient is here today for 2nd post operative visit. SPO Left clavicle ORIF DOS: 2/21/24  He denies fever or chills, redness around or near the incision site(s), numbness/tingling. He denies nausea/ vomiting and admits to their appetite since their surgery being back to normal. Normal bowel habits at this time. Patient presents today doing well with no complaints. He is very satisfied with the results of his surgery. He has been working. Patient since their surgery has utilized tylenol as their primary pain control with relief in their symptoms. They no longer require narcotics for pain control.  [de-identified] : Incisions are clean, dry and intact. No surrounding erythema. No drainage. Wound appears to be healing well. He has active range of motion of 160  forward flexion. Motor and sensation are intact distally. He has full range of motion of all fingers with capillary refill of <2 seconds throughout. He has good nerve function and median nerve, ulnar, radial, PIN, AIN. He has no sensory deficits over the C5-T1 nerve roots. Radial pulses 2+. Able to make an A-OK sign and thumbs up sign: able to flex/extend thumb, able to cross middle over index finger. Full ROM elbow, wrist, hand [de-identified] : 2 views of the left clavicle were obtained today that show no dislocation. Hardware in good alignment. There is good healing appreciated. right kne [de-identified] : I had a discussion with the patient regarding the operative and postoperative course. The patient is doing well and has excellent ROM. He does not require physical therapy at this time and can work out on his own. Patient will follow up in 6 wks for repeat clinical assessment. All questions were answered and the patient verbalized understanding. The patient is in agreement with this treatment plan.

## 2025-05-29 NOTE — H&P PST ADULT - NSANTHOSAYNRD_GEN_A_CORE
No. JOSEF screening performed.  STOP BANG Legend: 0-2 = LOW Risk; 3-4 = INTERMEDIATE Risk; 5-8 = HIGH Risk

## 2025-05-29 NOTE — H&P PST ADULT - NSICDXPASTMEDICALHX_GEN_ALL_CORE_FT
PAST MEDICAL HISTORY:  Mixed hyperlipidemia     Ulcerative colitis with other complication      PAST MEDICAL HISTORY:  H/O ulcerative colitis     Mixed hyperlipidemia

## 2025-05-30 LAB
CULTURE RESULTS: SIGNIFICANT CHANGE UP
SPECIMEN SOURCE: SIGNIFICANT CHANGE UP

## 2025-06-03 ENCOUNTER — NON-APPOINTMENT (OUTPATIENT)
Age: 70
End: 2025-06-03

## 2025-06-03 PROBLEM — Z96.651 STATUS POST TOTAL RIGHT KNEE REPLACEMENT: Status: ACTIVE | Noted: 2025-06-03

## 2025-06-10 PROBLEM — Z87.19 PERSONAL HISTORY OF OTHER DISEASES OF THE DIGESTIVE SYSTEM: Chronic | Status: ACTIVE | Noted: 2025-05-29

## 2025-06-11 ENCOUNTER — APPOINTMENT (OUTPATIENT)
Dept: CT IMAGING | Facility: CLINIC | Age: 70
End: 2025-06-11

## 2025-06-11 ENCOUNTER — OUTPATIENT (OUTPATIENT)
Dept: OUTPATIENT SERVICES | Facility: HOSPITAL | Age: 70
LOS: 1 days | End: 2025-06-11
Payer: COMMERCIAL

## 2025-06-11 DIAGNOSIS — Z98.890 OTHER SPECIFIED POSTPROCEDURAL STATES: Chronic | ICD-10-CM

## 2025-06-11 DIAGNOSIS — M17.11 UNILATERAL PRIMARY OSTEOARTHRITIS, RIGHT KNEE: ICD-10-CM

## 2025-06-11 PROCEDURE — 73700 CT LOWER EXTREMITY W/O DYE: CPT

## 2025-06-11 PROCEDURE — 73700 CT LOWER EXTREMITY W/O DYE: CPT | Mod: 26,RT

## 2025-06-18 ENCOUNTER — TRANSCRIPTION ENCOUNTER (OUTPATIENT)
Age: 70
End: 2025-06-18

## 2025-06-18 ENCOUNTER — APPOINTMENT (OUTPATIENT)
Dept: ORTHOPEDIC SURGERY | Facility: HOSPITAL | Age: 70
End: 2025-06-18

## 2025-06-18 ENCOUNTER — RESULT REVIEW (OUTPATIENT)
Age: 70
End: 2025-06-18

## 2025-06-18 ENCOUNTER — INPATIENT (INPATIENT)
Facility: HOSPITAL | Age: 70
LOS: 0 days | Discharge: HOME HEALTH | End: 2025-06-19
Attending: ORTHOPAEDIC SURGERY | Admitting: ORTHOPAEDIC SURGERY
Payer: MEDICARE

## 2025-06-18 VITALS
HEIGHT: 61.5 IN | TEMPERATURE: 98 F | HEART RATE: 86 BPM | RESPIRATION RATE: 18 BRPM | DIASTOLIC BLOOD PRESSURE: 58 MMHG | OXYGEN SATURATION: 100 % | WEIGHT: 160.06 LBS | SYSTOLIC BLOOD PRESSURE: 131 MMHG

## 2025-06-18 DIAGNOSIS — M17.11 UNILATERAL PRIMARY OSTEOARTHRITIS, RIGHT KNEE: ICD-10-CM

## 2025-06-18 DIAGNOSIS — Z98.890 OTHER SPECIFIED POSTPROCEDURAL STATES: Chronic | ICD-10-CM

## 2025-06-18 LAB — GLUCOSE BLDC GLUCOMTR-MCNC: 109 MG/DL — HIGH (ref 70–99)

## 2025-06-18 PROCEDURE — 0055T BONE SRGRY CMPTR CT/MRI IMAG: CPT

## 2025-06-18 PROCEDURE — 88304 TISSUE EXAM BY PATHOLOGIST: CPT | Mod: 26

## 2025-06-18 PROCEDURE — S2900 ROBOTIC SURGICAL SYSTEM: CPT | Mod: NC

## 2025-06-18 PROCEDURE — 27447 TOTAL KNEE ARTHROPLASTY: CPT | Mod: RT

## 2025-06-18 PROCEDURE — 73560 X-RAY EXAM OF KNEE 1 OR 2: CPT | Mod: 26,RT

## 2025-06-18 PROCEDURE — 20985 CPTR-ASST DIR MS PX: CPT

## 2025-06-18 DEVICE — TRIATHLON TIBIAL COMP SZ 3: Type: IMPLANTABLE DEVICE | Status: FUNCTIONAL

## 2025-06-18 DEVICE — COMP FEM TRIATHLON CR SZ3 RT: Type: IMPLANTABLE DEVICE | Status: FUNCTIONAL

## 2025-06-18 DEVICE — MAKO BONE PIN 3.2MM X 110MM: Type: IMPLANTABLE DEVICE | Status: FUNCTIONAL

## 2025-06-18 DEVICE — INSERT TIB BEARING TRIATHLON CS X3 SZ 3 9MM: Type: IMPLANTABLE DEVICE | Status: FUNCTIONAL

## 2025-06-18 DEVICE — MAKO BONE PIN 3.2MM X 140MM: Type: IMPLANTABLE DEVICE | Status: FUNCTIONAL

## 2025-06-18 RX ORDER — SENNA 187 MG
2 TABLET ORAL AT BEDTIME
Refills: 0 | Status: DISCONTINUED | OUTPATIENT
Start: 2025-06-18 | End: 2025-06-19

## 2025-06-18 RX ORDER — POLYETHYLENE GLYCOL 3350 17 G/17G
17 POWDER, FOR SOLUTION ORAL AT BEDTIME
Refills: 0 | Status: DISCONTINUED | OUTPATIENT
Start: 2025-06-18 | End: 2025-06-19

## 2025-06-18 RX ORDER — TRAMADOL HYDROCHLORIDE 50 MG/1
50 TABLET, FILM COATED ORAL EVERY 6 HOURS
Refills: 0 | Status: DISCONTINUED | OUTPATIENT
Start: 2025-06-18 | End: 2025-06-19

## 2025-06-18 RX ORDER — CEFAZOLIN SODIUM IN 0.9 % NACL 3 G/100 ML
2000 INTRAVENOUS SOLUTION, PIGGYBACK (ML) INTRAVENOUS EVERY 8 HOURS
Refills: 0 | Status: COMPLETED | OUTPATIENT
Start: 2025-06-18 | End: 2025-06-19

## 2025-06-18 RX ORDER — MAGNESIUM HYDROXIDE 400 MG/5ML
30 SUSPENSION ORAL DAILY
Refills: 0 | Status: DISCONTINUED | OUTPATIENT
Start: 2025-06-18 | End: 2025-06-19

## 2025-06-18 RX ORDER — ASPIRIN 325 MG
81 TABLET ORAL
Refills: 0 | Status: DISCONTINUED | OUTPATIENT
Start: 2025-06-18 | End: 2025-06-19

## 2025-06-18 RX ORDER — ATORVASTATIN CALCIUM 80 MG/1
10 TABLET, FILM COATED ORAL AT BEDTIME
Refills: 0 | Status: DISCONTINUED | OUTPATIENT
Start: 2025-06-18 | End: 2025-06-19

## 2025-06-18 RX ORDER — OXYCODONE HYDROCHLORIDE 30 MG/1
5 TABLET ORAL ONCE
Refills: 0 | Status: DISCONTINUED | OUTPATIENT
Start: 2025-06-18 | End: 2025-06-18

## 2025-06-18 RX ORDER — ACETAMINOPHEN 500 MG/5ML
1000 LIQUID (ML) ORAL ONCE
Refills: 0 | Status: COMPLETED | OUTPATIENT
Start: 2025-06-19 | End: 2025-06-19

## 2025-06-18 RX ORDER — ONDANSETRON HCL/PF 4 MG/2 ML
4 VIAL (ML) INJECTION ONCE
Refills: 0 | Status: DISCONTINUED | OUTPATIENT
Start: 2025-06-18 | End: 2025-06-18

## 2025-06-18 RX ORDER — ACETAMINOPHEN 500 MG/5ML
1000 LIQUID (ML) ORAL ONCE
Refills: 0 | Status: COMPLETED | OUTPATIENT
Start: 2025-06-18 | End: 2025-06-18

## 2025-06-18 RX ORDER — CELECOXIB 50 MG/1
200 CAPSULE ORAL EVERY 12 HOURS
Refills: 0 | Status: DISCONTINUED | OUTPATIENT
Start: 2025-06-19 | End: 2025-06-19

## 2025-06-18 RX ORDER — ONDANSETRON HCL/PF 4 MG/2 ML
4 VIAL (ML) INJECTION EVERY 6 HOURS
Refills: 0 | Status: DISCONTINUED | OUTPATIENT
Start: 2025-06-18 | End: 2025-06-19

## 2025-06-18 RX ORDER — OXYCODONE HYDROCHLORIDE 30 MG/1
5 TABLET ORAL EVERY 4 HOURS
Refills: 0 | Status: DISCONTINUED | OUTPATIENT
Start: 2025-06-18 | End: 2025-06-19

## 2025-06-18 RX ORDER — OXYCODONE HYDROCHLORIDE 30 MG/1
10 TABLET ORAL EVERY 4 HOURS
Refills: 0 | Status: DISCONTINUED | OUTPATIENT
Start: 2025-06-18 | End: 2025-06-19

## 2025-06-18 RX ORDER — HYDROMORPHONE/SOD CHLOR,ISO/PF 2 MG/10 ML
0.5 SYRINGE (ML) INJECTION
Refills: 0 | Status: DISCONTINUED | OUTPATIENT
Start: 2025-06-18 | End: 2025-06-18

## 2025-06-18 RX ORDER — KETOROLAC TROMETHAMINE 30 MG/ML
15 INJECTION, SOLUTION INTRAMUSCULAR; INTRAVENOUS EVERY 6 HOURS
Refills: 0 | Status: DISCONTINUED | OUTPATIENT
Start: 2025-06-18 | End: 2025-06-19

## 2025-06-18 RX ORDER — DEXAMETHASONE 0.5 MG/1
8 TABLET ORAL ONCE
Refills: 0 | Status: COMPLETED | OUTPATIENT
Start: 2025-06-19 | End: 2025-06-19

## 2025-06-18 RX ORDER — ACETAMINOPHEN 500 MG/5ML
1000 LIQUID (ML) ORAL EVERY 8 HOURS
Refills: 0 | Status: DISCONTINUED | OUTPATIENT
Start: 2025-06-19 | End: 2025-06-19

## 2025-06-18 RX ADMIN — Medication 500 MILLILITER(S): at 15:52

## 2025-06-18 RX ADMIN — Medication 81 MILLIGRAM(S): at 17:24

## 2025-06-18 RX ADMIN — Medication 40 MILLIGRAM(S): at 11:46

## 2025-06-18 RX ADMIN — ATORVASTATIN CALCIUM 10 MILLIGRAM(S): 80 TABLET, FILM COATED ORAL at 21:06

## 2025-06-18 RX ADMIN — Medication 3 MILLILITER(S): at 22:03

## 2025-06-18 RX ADMIN — Medication 400 MILLIGRAM(S): at 22:40

## 2025-06-18 RX ADMIN — OXYCODONE HYDROCHLORIDE 5 MILLIGRAM(S): 30 TABLET ORAL at 17:25

## 2025-06-18 RX ADMIN — SODIUM CHLORIDE 30 MILLILITER(S): 9 INJECTION, SOLUTION INTRAVENOUS at 11:45

## 2025-06-18 RX ADMIN — POLYETHYLENE GLYCOL 3350 17 GRAM(S): 17 POWDER, FOR SOLUTION ORAL at 21:06

## 2025-06-18 RX ADMIN — Medication 3 MILLILITER(S): at 15:44

## 2025-06-18 RX ADMIN — Medication 1 APPLICATION(S): at 11:45

## 2025-06-18 RX ADMIN — TRAMADOL HYDROCHLORIDE 50 MILLIGRAM(S): 50 TABLET, FILM COATED ORAL at 11:46

## 2025-06-18 RX ADMIN — Medication 100 MILLIGRAM(S): at 21:06

## 2025-06-18 RX ADMIN — Medication 1000 MILLIGRAM(S): at 23:40

## 2025-06-18 RX ADMIN — Medication 500 MILLILITER(S): at 19:40

## 2025-06-18 RX ADMIN — Medication 2 TABLET(S): at 21:06

## 2025-06-18 NOTE — PHYSICAL THERAPY INITIAL EVALUATION ADULT - ADDITIONAL COMMENTS
Pt will be discharged to Son/s private house, +3 steps to enter, will reside on the first floor. Pt was independent with all functional mobility and ADL performance without use of an assistive device prior to admission.     Pt left semi-supine in bed, all lines intact, all needs in reach, bed alarm set, in NAD. DWAINE Nair aware. Heart rate 75 beats per minute.

## 2025-06-18 NOTE — PHYSICAL THERAPY INITIAL EVALUATION ADULT - PATIENT/FAMILY AGREES WITH PLAN
The Delivery OB Provider certifies that vaginal examination and/or abdominal examination after the delivery was done and no foreign body was found. yes

## 2025-06-18 NOTE — PROGRESS NOTE ADULT - ASSESSMENT
A/P: 69y y/o Female s/p R total knee arthroplasty and lipoma removal, POD #0  - Pain control  - Antibiotic - Ancef postop  - Incentive Spirometry  - DVT prophylaxis: Venodynes/Aspirin 81mg BID  - F/U AM Labs  - F/U OR Path  - PT/OT  - WBAT  - Notify Orthopedics with any questions

## 2025-06-18 NOTE — DISCHARGE NOTE PROVIDER - CARE PROVIDERS DIRECT ADDRESSES
[email or web address]

## 2025-06-18 NOTE — DISCHARGE NOTE PROVIDER - HOSPITAL COURSE
This is a 68yo Female with PMH of UC, HLD who presents to [facility] for orthopedic surgery. Patient s/p R TKA and removal of lipoma with Dr. Philippe on 6/18/25. Patient tolerated the procedure well without any intraoperative complications. Patient tolerated physical therapy well, and the pain was controlled. Patient is weight bearing as tolerated with cane/walker as needed. Seen by medical attending for continuity of care and management and cleared for safe discharge. Keep dressing/incision clean, dry and intact. Any suture/staples to be removed on post-op day #14 at your office visit. Patient is on 81 mg of ASA BID for DVT prophylaxis, please take for 6 weeks unless otherwise instructed by your surgeon. Please follow up with Dr. Philippe in 2 weeks. Please follow up with your PMD for continuity of care and management as medications may have changed.

## 2025-06-18 NOTE — DISCHARGE NOTE PROVIDER - CARE PROVIDER_API CALL
Gail Philippe  Orthopaedic Surgery  [address], [address]  [place] [address]  Phone: [phone]  Fax: [phone]  Follow Up Time:

## 2025-06-18 NOTE — DISCHARGE NOTE PROVIDER - NSDCCPTREATMENT_GEN_ALL_CORE_FT
PRINCIPAL PROCEDURE  Procedure: Right total knee arthroplasty  Findings and Treatment: Pain control:        -Acetaminophen 325mg - 3 tabs every 8 hours  As needed:        -Tramadol 50mg - 1 tab every 6 hours - Take only if needed for MODERATE pain       -Oxycodone 5mg - 1 tab every 4 hours - Take only if needed for SEVERE or BREAKTHROUGH pain  Oxycodone and Tramadol have been sent to your pharmacy. Please do not drive, operate machinery, or make important decisions while taking these medications.   Other Medications:  -Aspirin (Enteric Coated) 81mg every 12 hours - to prevent blood clots (for 6 weeks post operatively.)  -Protonix 40mg - 1 tab every 24 hours - to prevent stomach irritation/ulcers  -Senna 8.6mg - 2 pills every 24 hours - stool softener  -Miralax 17g - daily - constipation   Follow up: Please follow up at your prescheduled post-operative follow up appointment with Dr. Philippe for 2 weeks after hospital discharge. Please call with any questions or concerns including fevers, worsening pain, pus from the wounds, redness of the skin and difficulty breathing or heaviness in the chest at [phone].

## 2025-06-18 NOTE — ASU PREOP CHECKLIST - NS PREOP CHK HIBICLENS NA
Cardiology Progress Note            Subjective:     Feeling well. Degree of SOB and leg swelling slowly improving.       Objective:     Vital Last Value 24 Hour Range   Temperature 97.7 °F (36.5 °C) (12/03/22 0432) Temp  Min: 97.3 °F (36.3 °C)  Max: 97.7 °F (36.5 °C)   Pulse 96 (12/03/22 0852) Pulse  Min: 62  Max: 96   Respiratory 20 (12/03/22 0432) Resp  Min: 18  Max: 20   Non-Invasive  Blood Pressure 106/72 (12/03/22 0852) BP  Min: 99/73  Max: 125/73   Pulse Oximetry 94 % (12/03/22 0852) SpO2  Min: 94 %  Max: 98 %   Arterial   Blood Pressure   No data recorded     Intake/Output:    Intake/Output Summary (Last 24 hours) at 12/3/2022 0909  Last data filed at 12/3/2022 0500  Gross per 24 hour   Intake 1320 ml   Output 2050 ml   Net -730 ml       I/O last 3 completed shifts:  In: 1358.5 [I.V.:158.5; Other:850; IV Piggyback:350]  Out: 2050 [Urine:2050]     Weight    11/30/22 2223 12/01/22 0527 12/02/22 0500 12/03/22 0500   Weight: (!) 137.5 kg (303 lb 2.1 oz) (!) 137.4 kg (302 lb 14.6 oz) (!) 138.7 kg (305 lb 12.5 oz) 133.5 kg (294 lb 6.4 oz)       Current Facility-Administered Medications   Medication   • bumetanide (BUMEX) 12.5 mg/50 mL infusion   • lidocaine 2% urethral (UROJET) 2 % jelly 10 mL   • sacubitril-valsartan (ENTRESTO) 24-26 MG per tablet 1 tablet   • empagliflozin (JARDIANCE) tablet 10 mg   • ipratropium-albuterol (DUONEB) 0.5-2.5 (3) MG/3ML nebulizer solution 3 mL   • enoxaparin (LOVENOX) injection 135 mg   • methylPREDNISolone (SOLU-Medrol) PF injection 40 mg   • sodium chloride 0.9 % flush bag 25 mL   • sodium chloride (PF) 0.9 % injection 2 mL   • azithromycin (ZITHROMAX) 500 mg in sodium chloride 0.9 % 250 mL IVPB   • cefTRIAXone (ROCEPHIN) syringe 1,000 mg   • ipratropium-albuterol (DUONEB) 0.5-2.5 (3) MG/3ML nebulizer solution 3 mL   • pantoprazole (PROTONIX) EC tablet 40 mg   • ALPRAZolam (XANAX) tablet 0.5 mg   • [Held by provider] atorvastatin (LIPITOR) tablet 40 mg   • metoPROLOL succinate  (TOPROL-XL) ER tablet 100 mg   • montelukast (SINGULAIR) tablet 10 mg   • venlafaxine XR (EFFEXOR XR) 24 hr capsule 150 mg       Physical Examination:      CONSTITUTIONAL: No acute distress  EYES:  PERRL, EOMI, anicteric sclera  RESPIRATORY: Diminished throughout, no rales, rhonchi, occasional wheezing.   CARDIOVASCULAR:   Irregular regular rate and rhythm with normal S1, S2 and no murmur.  No rubs or gallops.  No S3 or S4.  GASTROINTESTINAL: normoactive bowel sounds, nontender/nondistended, no organomegaly  CHEST:  No chest wall tenderness to palpitation  MUSCULOSKELETAL: no joint effusions; no asymmetry   EXTREMITIES: 1+ pitting bilateral LE edema up to abdomen  NEURO:  Forgetful, poor historian, AAO x2  SKIN:  No rashes  PSYCH: Appropriate     Laboratory Data:     Cardiac Labs  Recent Labs   Lab 12/02/22 0449 12/01/22 0457 11/30/22  2053 11/30/22  1754 11/30/22  1538   TSH  --  0.453  --   --   --    HTROPI  --   --  154* 169* 172*   NTPROB 3,039*  --   --   --  3,133*       CMP  Recent Labs   Lab 12/03/22  0803 12/02/22 0449 12/01/22 0457   SODIUM 143 138 141   POTASSIUM 4.2 4.2 4.3   CHLORIDE 104 100 102   CO2 30 25 26   GLUCOSE 163* 186* 180*   BUN 42* 42* 21*   CREATININE 1.56* 2.04* 1.23*   CALCIUM 8.6 8.4 8.4   TOTPROTEIN 6.0* 6.2* 6.3*   ALBUMIN 2.9* 3.0* 3.2*   BILIRUBIN 0.7 0.6 0.9   * 247* 441*   * 605* 604*   ALKPT 191* 217* 191*       CBC  Recent Labs   Lab 12/03/22  0803 12/02/22 0449 12/01/22 0457 11/30/22  1538   WBC  --  16.5* 7.7 11.3*   HCT 46.6 47.1 45.4 48.1   HGB 14.5 14.4 13.9 14.6    258 268 278       Coags  No results found      Imaging:     Personally reviewed imaging  US KIDNEY BILATERAL   Final Result       Bilateral nephrolithiasis. No hydronephrosis.      Electronically Signed by: SHELTON BLACKBURN M.D.    Signed on: 12/2/2022 11:50 AM          MRI BRAIN WO CONTRAST   Final Result   No evidence acute intracranial process.      Electronically Signed by: VERA GARCIA  MD    Signed on: 12/2/2022 9:53 AM          US BLADDER   Final Result       Large post void residual.      Electronically Signed by: CEFERINO SHULTZ MD    Signed on: 12/1/2022 6:01 PM          XR CHEST PA OR AP 1 VIEW   Final Result      1.    Right basilar consolidation representing atelectasis or pneumonia.   Small to moderate right pleural effusion.    2.    Mild generalized pulmonary edema versus an atypical infectious   process. Cardiomegaly.      Electronically Signed by: CEFERINO SHULTZ MD    Signed on: 11/30/2022 4:03 PM              Cardiac Testing personally reviewed -    ECG: I personally reviewed and interpreted the ECG from 12/1/22 which reveals atrial fibrillation rapid ventricular response, nonspecific ST and T wave changes.  Nonspecific intraventricular conduction delay    Tele: I personally reviewed and interpreted telemetry strips which shows atrial fibrillation rates in 90s.    CXR: I personally reviewed and interpreted images from study on 12/1/22 showing cardiomegaly, pleural effusion, pulmonary vascular congestion possible pneumonia.    Echocardiogram: I personally reviewed and interpreted images from 12/1/22 study which shows   Severely reduced LV systolic function with EF 30%, global hypokinesis. No significant valvular abnormalities.     Echo 2019: EF 50-55%      Assessment/Plan:     60 year old male with history of anxiety and depression to the point where his sister makes his medical decisions.  He has history of coronary artery disease, cardiomyopathy with recovered ejection fraction, hypertension and hyperlipidemia.  He has not followed up with cardiology since December 2020.  He is admitted with 4 days of increasing dyspnea ankle edema and found to be in atrial fibrillation with rapid ventricular response.     1. Atrial fibrillation: Onset unclear. Pt is very poor historian. Rates improving with diuresis. Certainly could contribute to CHF. Will discuss rhythm control as outpatient once  euvolemic.     2. HFrEF: Ischemic cardiomyopathy by notes. He has not followed up with cardiology as outpatient in several years. EF 50-55% previously, now 30%. Will need repeat ischemic evaluation prior to rhythm control attempt (so anticoagulation will not require interruption after). For now without chest pain. GDMT limited due to hypotension. He still has significant volume overload with edema up to abdomen. I am stopping bumex IV BID and starting bumex drip.   Continue metoprolol and entresto - hypotension prohibits increasing further.     3. Hypertension: Low-normal today, he is asymptomatic. I am stopping diltiazem given reduced EF and heart failure.     4. Renal insufficiency: Due to urinary retention. Now with blanco in place creatinine improving.     5. Morbid obesity: Weight loss essential in improving quality of life and ability to function.     6. Obstructive sleep apnea: Using CPAP/BIPAP here. Does not have at home. Pulmonary is following.     7. Anxiety and depression: Sister is POA. Due to memory impairment MRI is being ordered as inpatient and scheduled today. PCP following.     8. Transaminitis: likely due to CHF. If no improvement with diuresis may need GI evaluation. Diet discussed. Statin is on hold.     I personally performed chart review, history, and physical examination and discussed management with the managing cardiologist.     Plan of care discussed with the patient, the nursing staff.  All questions were answered.  Patient verbalized understanding and agrees with the plan.     Roxann Sierra FNP-BC, CVNP-BC  [facility]  [facility]    Patient continues with large diuresis.  He is improving steadily.  He is currently being treated both for heart failure as well as for pneumonia.  Records from pulmonology were reviewed.  Agree with management as noted above.   #1:

## 2025-06-18 NOTE — DISCHARGE NOTE PROVIDER - NSDCCPCAREPLAN_GEN_ALL_CORE_FT
PRINCIPAL DISCHARGE DIAGNOSIS  Diagnosis: Unilateral primary osteoarthritis, right knee  Assessment and Plan of Treatment: Diet: Continue regular diet upon discharge.   Activity: No heavy lifting > 25 lbs for 4 weeks. Avoid straining or excessive activity x 6 weeks.  -No showering until post-operative day 5.  -Continue to use your walker when ambulating until your postoperative follow up appointment.   Dressings: Keep dressing clean, dry, and intact. Your doctor will remove your bandage at your post-operative follow up appointment.   Other Care:   -You may shower when you get home but DO NOT soak dressing and/or incision. The water may run over your dressing/incision but DO NOT let the water directly hit your dressing/incision (take a shower with your wound away from the direct stream of water). NO hot tubes, NO bath tubs, NO swimming pools.   -Elevate your operative leg 2 feet above heart level for 2 hours in the morning, 2 hours in the afternoon, and 2 hours in the evening.   -Apply ice for 20min every time you elevate.   -Sit for 90 min/day: 45mins x2 or 30min x3  -DO NOT sit for more than the 90min/day. Walk or lay down when not elevating your leg.   -DO NOT place the elevation pillow behind your knees. Only place it under your calf and heel.   -DO NOT bend more than 45 degrees at the waist

## 2025-06-18 NOTE — ASU PREOP CHECKLIST - MUPIRONCIN COMMENTS
Requested Prescriptions   Pending Prescriptions Disp Refills     zolpidem (AMBIEN) 5 MG tablet [Pharmacy Med Name: ZOLPIDEM TARTRATE 5 MG TABLET] 30 tablet      Sig: TAKE 1 TABLET BY MOUTH NIGHTLY AS NEEDED FOR SLEEP.       There is no refill protocol information for this order        Last Written Prescription Date:  9/4/19  Last Fill Quantity: 30,  # refills: 5   Last office visit: 4/15/2019 with prescribing provider:  Eleuterio   Future Office Visit:   Next 5 appointments (look out 90 days)    Apr 15, 2020  8:50 AM CDT  PHYSICAL with Lynda Mcclellan MD  [facility] ([facility] [address] [place] [id number] [phone]         Routing to provider to advise.  Dominga Raygoza RN on 2/25/2020 at 12:23 PM        Ambien prescription was printed at [facility]. Looking in chart to see why it printed at this printer.     Darian Wise, SMA    
MRSA, Staph not detected

## 2025-06-18 NOTE — PATIENT PROFILE ADULT - FALL HARM RISK - HARM RISK INTERVENTIONS
Assistance with ambulation/Assistance OOB with selected safe patient handling equipment/Communicate Risk of Fall with Harm to all staff/Discuss with provider need for PT consult/Monitor gait and stability/Provide patient with walking aids - walker, cane, crutches/Reinforce activity limits and safety measures with patient and family/Sit up slowly, dangle for a short time, stand at bedside before walking/Tailored Fall Risk Interventions/Use of alarms - bed, chair and/or voice tab/Visual Cue: Yellow wristband and red socks/Bed in lowest position, wheels locked, appropriate side rails in place/Call bell, personal items and telephone in reach/Instruct patient to call for assistance before getting out of bed or chair/Non-slip footwear when patient is out of bed/[place] to call system/Physically safe environment - no spills, clutter or unnecessary equipment/Purposeful Proactive Rounding/Room/bathroom lighting operational, light cord in reach

## 2025-06-18 NOTE — DISCHARGE NOTE PROVIDER - NSDCFUSCHEDAPPT_GEN_ALL_CORE_FT
Gail Philippe  [facility]  ORTHOSURG [address] [facility]  Scheduled Appointment: 07/03/2025

## 2025-06-19 ENCOUNTER — TRANSCRIPTION ENCOUNTER (OUTPATIENT)
Age: 70
End: 2025-06-19

## 2025-06-19 VITALS
SYSTOLIC BLOOD PRESSURE: 136 MMHG | DIASTOLIC BLOOD PRESSURE: 66 MMHG | TEMPERATURE: 98 F | RESPIRATION RATE: 17 BRPM | HEART RATE: 76 BPM | OXYGEN SATURATION: 97 %

## 2025-06-19 DIAGNOSIS — E78.5 HYPERLIPIDEMIA, UNSPECIFIED: ICD-10-CM

## 2025-06-19 DIAGNOSIS — Z29.9 ENCOUNTER FOR PROPHYLACTIC MEASURES, UNSPECIFIED: ICD-10-CM

## 2025-06-19 DIAGNOSIS — D72.829 ELEVATED WHITE BLOOD CELL COUNT, UNSPECIFIED: ICD-10-CM

## 2025-06-19 DIAGNOSIS — K51.90 ULCERATIVE COLITIS, UNSPECIFIED, WITHOUT COMPLICATIONS: ICD-10-CM

## 2025-06-19 LAB
ANION GAP SERPL CALC-SCNC: 14 MMOL/L — SIGNIFICANT CHANGE UP (ref 7–14)
BUN SERPL-MCNC: 16 MG/DL — SIGNIFICANT CHANGE UP (ref 7–23)
CALCIUM SERPL-MCNC: 9.9 MG/DL — SIGNIFICANT CHANGE UP (ref 8.4–10.5)
CHLORIDE SERPL-SCNC: 105 MMOL/L — SIGNIFICANT CHANGE UP (ref 98–107)
CO2 SERPL-SCNC: 21 MMOL/L — LOW (ref 22–31)
CREAT SERPL-MCNC: 0.65 MG/DL — SIGNIFICANT CHANGE UP (ref 0.5–1.3)
EGFR: 96 ML/MIN/1.73M2 — SIGNIFICANT CHANGE UP
EGFR: 96 ML/MIN/1.73M2 — SIGNIFICANT CHANGE UP
GLUCOSE SERPL-MCNC: 122 MG/DL — HIGH (ref 70–99)
HCT VFR BLD CALC: 34.6 % — SIGNIFICANT CHANGE UP (ref 34.5–45)
HGB BLD-MCNC: 11.6 G/DL — SIGNIFICANT CHANGE UP (ref 11.5–15.5)
MCHC RBC-ENTMCNC: 29 PG — SIGNIFICANT CHANGE UP (ref 27–34)
MCHC RBC-ENTMCNC: 33.5 G/DL — SIGNIFICANT CHANGE UP (ref 32–36)
MCV RBC AUTO: 86.5 FL — SIGNIFICANT CHANGE UP (ref 80–100)
NRBC # BLD AUTO: 0 K/UL — SIGNIFICANT CHANGE UP (ref 0–0)
NRBC # FLD: 0 K/UL — SIGNIFICANT CHANGE UP (ref 0–0)
NRBC BLD AUTO-RTO: 0 /100 WBCS — SIGNIFICANT CHANGE UP (ref 0–0)
PLATELET # BLD AUTO: 291 K/UL — SIGNIFICANT CHANGE UP (ref 150–400)
PMV BLD: 10.4 FL — SIGNIFICANT CHANGE UP (ref 7–13)
POTASSIUM SERPL-MCNC: 4.1 MMOL/L — SIGNIFICANT CHANGE UP (ref 3.5–5.3)
POTASSIUM SERPL-SCNC: 4.1 MMOL/L — SIGNIFICANT CHANGE UP (ref 3.5–5.3)
RBC # BLD: 4 M/UL — SIGNIFICANT CHANGE UP (ref 3.8–5.2)
RBC # FLD: 12.4 % — SIGNIFICANT CHANGE UP (ref 10.3–14.5)
SODIUM SERPL-SCNC: 140 MMOL/L — SIGNIFICANT CHANGE UP (ref 135–145)
WBC # BLD: 18.14 K/UL — HIGH (ref 3.8–10.5)
WBC # FLD AUTO: 18.14 K/UL — HIGH (ref 3.8–10.5)

## 2025-06-19 PROCEDURE — 99222 1ST HOSP IP/OBS MODERATE 55: CPT

## 2025-06-19 RX ORDER — SENNA 187 MG
2 TABLET ORAL
Qty: 28 | Refills: 0
Start: 2025-06-19 | End: 2025-07-02

## 2025-06-19 RX ORDER — OXYCODONE HYDROCHLORIDE 30 MG/1
1 TABLET ORAL
Qty: 28 | Refills: 0
Start: 2025-06-19 | End: 2025-06-25

## 2025-06-19 RX ORDER — ASPIRIN 325 MG
1 TABLET ORAL
Qty: 84 | Refills: 0
Start: 2025-06-19 | End: 2025-07-30

## 2025-06-19 RX ORDER — NALOXONE HYDROCHLORIDE 0.4 MG/ML
1 INJECTION, SOLUTION INTRAMUSCULAR; INTRAVENOUS; SUBCUTANEOUS
Qty: 1 | Refills: 0
Start: 2025-06-19 | End: 2025-06-19

## 2025-06-19 RX ORDER — TRAMADOL HYDROCHLORIDE 50 MG/1
1 TABLET, FILM COATED ORAL
Qty: 28 | Refills: 0
Start: 2025-06-19 | End: 2025-06-25

## 2025-06-19 RX ORDER — ACETAMINOPHEN 500 MG/5ML
2 LIQUID (ML) ORAL
Qty: 0 | Refills: 0 | DISCHARGE
Start: 2025-06-19

## 2025-06-19 RX ORDER — CELECOXIB 50 MG/1
1 CAPSULE ORAL
Qty: 28 | Refills: 0
Start: 2025-06-19 | End: 2025-07-02

## 2025-06-19 RX ORDER — POLYETHYLENE GLYCOL 3350 17 G/17G
17 POWDER, FOR SOLUTION ORAL
Qty: 238 | Refills: 0
Start: 2025-06-19 | End: 2025-07-02

## 2025-06-19 RX ADMIN — Medication 81 MILLIGRAM(S): at 05:32

## 2025-06-19 RX ADMIN — OXYCODONE HYDROCHLORIDE 5 MILLIGRAM(S): 30 TABLET ORAL at 14:23

## 2025-06-19 RX ADMIN — Medication 400 MILLIGRAM(S): at 07:00

## 2025-06-19 RX ADMIN — Medication 500 MILLILITER(S): at 07:04

## 2025-06-19 RX ADMIN — DEXAMETHASONE 101.6 MILLIGRAM(S): 0.5 TABLET ORAL at 05:32

## 2025-06-19 RX ADMIN — CELECOXIB 200 MILLIGRAM(S): 50 CAPSULE ORAL at 06:32

## 2025-06-19 RX ADMIN — KETOROLAC TROMETHAMINE 15 MILLIGRAM(S): 30 INJECTION, SOLUTION INTRAMUSCULAR; INTRAVENOUS at 09:42

## 2025-06-19 RX ADMIN — Medication 40 MILLIGRAM(S): at 05:32

## 2025-06-19 RX ADMIN — OXYCODONE HYDROCHLORIDE 5 MILLIGRAM(S): 30 TABLET ORAL at 07:04

## 2025-06-19 RX ADMIN — KETOROLAC TROMETHAMINE 15 MILLIGRAM(S): 30 INJECTION, SOLUTION INTRAMUSCULAR; INTRAVENOUS at 16:33

## 2025-06-19 RX ADMIN — Medication 81 MILLIGRAM(S): at 17:13

## 2025-06-19 RX ADMIN — OXYCODONE HYDROCHLORIDE 5 MILLIGRAM(S): 30 TABLET ORAL at 15:23

## 2025-06-19 RX ADMIN — Medication 3 MILLILITER(S): at 13:17

## 2025-06-19 RX ADMIN — Medication 100 MILLIGRAM(S): at 05:32

## 2025-06-19 RX ADMIN — CELECOXIB 200 MILLIGRAM(S): 50 CAPSULE ORAL at 05:32

## 2025-06-19 RX ADMIN — Medication 3 MILLILITER(S): at 06:05

## 2025-06-19 RX ADMIN — KETOROLAC TROMETHAMINE 15 MILLIGRAM(S): 30 INJECTION, SOLUTION INTRAMUSCULAR; INTRAVENOUS at 10:40

## 2025-06-19 RX ADMIN — CELECOXIB 200 MILLIGRAM(S): 50 CAPSULE ORAL at 17:12

## 2025-06-19 RX ADMIN — KETOROLAC TROMETHAMINE 15 MILLIGRAM(S): 30 INJECTION, SOLUTION INTRAMUSCULAR; INTRAVENOUS at 17:00

## 2025-06-19 NOTE — DISCHARGE NOTE NURSING/CASE MANAGEMENT/SOCIAL WORK - NSDCPECAREGIVERED_GEN_ALL_CORE
Medline and carenotes for surgical procedure TKR, precautions, incision care, pain mgt, Oxy IR, ASA, Tram, as well as DC Medications and side effects literature for patient reference./Yes Medline and carenotes for surgical procedure TKR, incision care, precautions, oxy IR, ASA, as well as DC Medications and side effects literature for patient reference./Yes

## 2025-06-19 NOTE — OCCUPATIONAL THERAPY INITIAL EVALUATION ADULT - PERTINENT HX OF CURRENT PROBLEM, REHAB EVAL
69 year old female with history of right knee arthritis and pain for 1 year. Patient s/p right total knee arthroplasty.

## 2025-06-19 NOTE — DISCHARGE NOTE NURSING/CASE MANAGEMENT/SOCIAL WORK - PATIENT PORTAL LINK FT
You can access the FollowMyHealth Patient Portal offered by [facility] by registering at the following website: [email or web address]. By joining [employer]’s FollowMyHealth portal, you will also be able to view your health information using other applications (apps) compatible with our system.

## 2025-06-19 NOTE — CONSULT NOTE ADULT - ASSESSMENT
69 year old female PMH ulcerative colitis (no longer on meds), HLD, right knee OA presents for scheduled Right Total Knee Arthroplasty. s/p R total knee arthroplasty and lipoma removal on 6/18

## 2025-06-19 NOTE — CONSULT NOTE ADULT - SUBJECTIVE AND OBJECTIVE BOX
CHIEF COMPLAINT: Patient is a 69y old  Female who presents with a chief complaint of s/p R TKA and removal of lipoma (18 Jun 2025 17:09)      HPI: HPI:  69 year old female PMH ulcerative colitis (no longer on meds), HLD, right knee OA presents for scheduled Right Total Knee Arthroplasty. Now postop and pain overall well controlled. Denies chest pain, SOB, abd pain, N/V/D. Has some constipation which isn't new.      Pain Symptoms if applicable:             	                         none	   mild         moderate         severe  	                            0	    1-3	     4-6	         7-10  Pain:  Location:	  Modifying factors:	  Associated symptoms:	    Allergies    No Known Allergies    Intolerances        HOME MEDICATIONS: [x] Reviewed    MEDICATIONS  (STANDING):  acetaminophen     Tablet .. 1000 milliGRAM(s) Oral every 8 hours  acetaminophen   IVPB .. 1000 milliGRAM(s) IV Intermittent once  aspirin enteric coated 81 milliGRAM(s) Oral two times a day  atorvastatin 10 milliGRAM(s) Oral at bedtime  celecoxib 200 milliGRAM(s) Oral every 12 hours  chlorhexidine 2% Cloths 1 Application(s) Topical daily  ketorolac   Injectable 15 milliGRAM(s) IV Push every 6 hours  lactated ringers. 1000 milliLiter(s) (30 mL/Hr) IV Continuous <Continuous>  pantoprazole    Tablet 40 milliGRAM(s) Oral before breakfast  polyethylene glycol 3350 17 Gram(s) Oral at bedtime  senna 2 Tablet(s) Oral at bedtime  sodium chloride 0.9% lock flush 3 milliLiter(s) IV Push every 8 hours    MEDICATIONS  (PRN):  magnesium hydroxide Suspension 30 milliLiter(s) Oral daily PRN Constipation  ondansetron Injectable 4 milliGRAM(s) IV Push every 6 hours PRN Nausea and/or Vomiting  oxyCODONE    IR 5 milliGRAM(s) Oral every 4 hours PRN Moderate Pain (4 - 6)  oxyCODONE    IR 10 milliGRAM(s) Oral every 4 hours PRN Severe Pain (7 - 10)  traMADol 50 milliGRAM(s) Oral every 6 hours PRN Mild Pain (1 - 3)      PAST MEDICAL & SURGICAL HISTORY:  Mixed hyperlipidemia      H/O ulcerative colitis      H/O arthroscopy of knee      [X] Reviewed     SOCIAL HISTORY:  [x] Substance abuse: denies  [x] Tobacco: denies  [x] Alcohol use: denies    FAMILY HISTORY:  FH: heart attack (Father)    [x] No pertinent family history in first degree relatives     REVIEW OF SYSTEMS:    [x] All other ROS negative  [  ] Unable to obtain due to poor mental status    Vital Signs Last 24 Hrs  T(C): 36.6 (19 Jun 2025 13:17), Max: 36.8 (18 Jun 2025 15:15)  T(F): 97.8 (19 Jun 2025 13:17), Max: 98.3 (19 Jun 2025 09:58)  HR: 83 (19 Jun 2025 13:17) (70 - 102)  BP: 133/59 (19 Jun 2025 13:17) (105/56 - 136/68)  BP(mean): 88 (18 Jun 2025 17:30) (69 - 88)  RR: 18 (19 Jun 2025 13:17) (14 - 20)  SpO2: 100% (19 Jun 2025 13:17) (95% - 100%)    Parameters below as of 19 Jun 2025 13:17  Patient On (Oxygen Delivery Method): room air        PHYSICAL EXAM:    GENERAL: NAD, on room air  HEENT sclera clear, eyes tracking  RESPIRATORY: Clear to auscultation bilaterally; No rales, rhonchi, wheezing, or rubs  CARDIOVASCULAR: Regular rate and rhythm; No murmurs, rubs, or gallops appreciated  GASTROINTESTINAL: Soft, Nontender, Nondistended; Bowel sounds present  GENITOURINARY: no aiken  EXTREMITIES:  WWP, no edema  NERVOUS SYSTEM:  Alert & Oriented X3; Moving all 4 extremities; No gross deficits  PSYCH: calm cooperative  SKIN: No rashes or lesions; dressing C/D/I    LABS:                        11.6   18.14 )-----------( 291      ( 19 Jun 2025 05:22 )             34.6     06-19    140  |  105  |  16  ----------------------------<  122[H]  4.1   |  21[L]  |  0.65    Ca    9.9      19 Jun 2025 05:22        Urinalysis Basic - ( 19 Jun 2025 05:22 )    Color: x / Appearance: x / SG: x / pH: x  Gluc: 122 mg/dL / Ketone: x  / Bili: x / Urobili: x   Blood: x / Protein: x / Nitrite: x   Leuk Esterase: x / RBC: x / WBC x   Sq Epi: x / Non Sq Epi: x / Bacteria: x      CAPILLARY BLOOD GLUCOSE      POCT Blood Glucose.: 109 mg/dL (18 Jun 2025 11:25)      RADIOLOGY & ADDITIONAL STUDIES:    EKG:   Personally Reviewed:  [x] YES     Imaging:   Personally Reviewed:  [x] YES               [ ] Consultant(s) Notes Reviewed  [x] Care Discussed with Consultants/Other Providers:

## 2025-06-19 NOTE — OCCUPATIONAL THERAPY INITIAL EVALUATION ADULT - LIVES WITH, PROFILE
Lives in a house with ; flight of stairs to the bedroom and bathroom, bathroom has a walk in shower/spouse

## 2025-06-19 NOTE — CONSULT NOTE ADULT - PROBLEM SELECTOR RECOMMENDATION 9
s/p R total knee arthroplasty and lipoma removal on 6/18  Postop management per ortho: pain control, DVT ppx  home PT

## 2025-06-19 NOTE — OCCUPATIONAL THERAPY INITIAL EVALUATION ADULT - DIAGNOSIS, OT EVAL
s/p right total knee arthroplasty; decreased ADL performance, decreased participation in functional mobility

## 2025-06-19 NOTE — OCCUPATIONAL THERAPY INITIAL EVALUATION ADULT - ADDITIONAL COMMENTS
Patient reports owning a cane at home for comfort but not used often. Post discharge patient will live with son for one week before returning back to her home.

## 2025-06-19 NOTE — DISCHARGE NOTE NURSING/CASE MANAGEMENT/SOCIAL WORK - NSDCPNINST_GEN_ALL_CORE
Maintain Knee incision and dressing clean dry and intact. Call MD with any signs of infection ie fever, redness, drainage, or with signs of bleeding, unrelieved increased pain, or persistent nausea. Continue to drink plenty of fluids. Avoid strenuous activity and constipation which may be a side effect from taking certain medications and narcotics.  Continue Knee Replacement precautions as well as safety and fall precautions in order to prevent injury.   You have a Post-Op appt scheduled with Dr. Philippe on July 3rd, 2025 at 11:30 AM in the [address] [place] [address].  Maintain Knee incision and dressing clean dry and intact. Call MD with any signs of infection ie fever, redness, drainage, or with signs of bleeding, unrelieved increased pain, or persistent nausea. Continue to drink plenty of fluids. Avoid strenuous activity and constipation which may be a side effect from taking certain medications and narcotics.  Continue Knee Replacement precautions as well as safety and fall precautions in order to prevent injury.

## 2025-06-19 NOTE — OCCUPATIONAL THERAPY INITIAL EVALUATION ADULT - GENERAL OBSERVATIONS, REHAB EVAL
Patient observed semisupine in NAD, complaining of mild pain in right LE. Despite pain, patient agreeable to participate in skilled occupational therapy evaluation. Patient left semisupine in NAD, call bell within reach, RN made aware.

## 2025-06-19 NOTE — CONSULT NOTE ADULT - TIME BILLING
Time spent includes direct patient care  (interview and examination of patient), discussion with other providers, support staff and/or patient's family members, review of medical records, ordering diagnostic tests and analyzing results, and documentation. Time spent was exclusive of teaching residents.

## 2025-06-19 NOTE — DISCHARGE NOTE NURSING/CASE MANAGEMENT/SOCIAL WORK - FINANCIAL ASSISTANCE
[facility] provides services at a reduced cost to those who are determined to be eligible through [facility]’s financial assistance program. Information regarding [facility]’s financial assistance program can be found by going to [email or web address] or by calling [phone].

## 2025-06-19 NOTE — DISCHARGE NOTE NURSING/CASE MANAGEMENT/SOCIAL WORK - NSDCPEFALRISK_GEN_ALL_CORE
For information on Fall & Injury Prevention, visit: [email or web address] OR  [email or web address] OR  https://www.cdc.gov/steadi/patient.html

## 2025-06-19 NOTE — OCCUPATIONAL THERAPY INITIAL EVALUATION ADULT - NSOTDISCHREC_GEN_A_CORE
Patient performing ADLs at/near baseline. No OT services required at this time. Please reconsult if needed./No skilled OT needs

## 2025-06-19 NOTE — PROGRESS NOTE ADULT - SUBJECTIVE AND OBJECTIVE BOX
SUBJECTIVE  No acute events overnight. Pain controlled. Denies fevers/chills, chest pain, or dyspnea. Ambulated in the halls with PT yesterday.     OBJECTIVE  Vital Signs Last 24 Hrs  T(C): 36.5 (19 Jun 2025 05:32), Max: 36.8 (18 Jun 2025 15:15)  T(F): 97.7 (19 Jun 2025 05:32), Max: 98.2 (18 Jun 2025 15:15)  HR: 70 (19 Jun 2025 05:32) (70 - 102)  BP: 130/66 (19 Jun 2025 05:32) (105/56 - 136/68)  BP(mean): 88 (18 Jun 2025 17:30) (69 - 88)  RR: 17 (19 Jun 2025 05:32) (14 - 20)  SpO2: 97% (19 Jun 2025 05:32) (95% - 100%)    Parameters below as of 19 Jun 2025 05:32  Patient On (Oxygen Delivery Method): room air        PHYSICAL EXAM  Gen: Lying in bed, NAD  Resp: No increased WOB  LLE:  Dressing c/d/i, compartments soft, no calf TTP b/l  Motor: TA/EHL/GS/FHL intact  Sensory: DP/SP/Tib/Umberto/Saph SILT  +DP pulse, WWP    LABS              ASSESSMENT & PLAN  69yFemale s/p L TKA on 6/18.  -WBAT LLE  -pain control  -ice/cold compress  -incentive spirometry  -DVT ppx  -PT/OT  -f/u AM labs  -dispo planning: home w [facility]    For all questions related to patient care, please reach out via the on-call pager.*     Fabi Griffin PGY2  Orthopedic Surgery  [facility]: [id number]  [facility]: [id number]  [facility] [place] [id number]  
Orthopedic Surgery      Pt seen and examined. Pt denies any overnight events. Pt reports pain is well controlled. Pt denies any fever/chills/chest pain/nausea/vomiting. Pt reports ambulating with assistance.     ICU Vital Signs Last 24 Hrs  T(C): 36.5 (19 Jun 2025 05:32), Max: 36.8 (18 Jun 2025 15:15)  T(F): 97.7 (19 Jun 2025 05:32), Max: 98.2 (18 Jun 2025 15:15)  HR: 70 (19 Jun 2025 05:32) (70 - 102)  BP: 130/66 (19 Jun 2025 05:32) (105/56 - 136/68)  BP(mean): 88 (18 Jun 2025 17:30) (69 - 88)  ABP: --  ABP(mean): --  RR: 17 (19 Jun 2025 05:32) (14 - 20)  SpO2: 97% (19 Jun 2025 05:32) (95% - 100%)          Physical exam:   Gen: NAD, alert and oriented  Resp: Unlabored breathing  RLE: Skin intact, no ecchymosis,   Dressing clean and dry intact        SILT DP/SP/ Umberto/Saph/tib       +IP/Quad/EHL/FHL/TA/Gastroc,        DP+,        soft compartments, no calf ttp     LABS:                      Assessment/Plan:   A/P: 69y y/o Female s/p R total knee arthroplasty and lipoma removal on 6/18  - Pain control   - Incentive Spirometry  - DVT prophylaxis: Venodynes/Aspirin 81mg BID  - F/U AM Labs  - F/U OR Path  - PT/OT  - WBAT  - Notify Orthopedics with any questions    Raleigh Sweeney PGY-2    For any questions please contact the on call orthopedic surgery team, please do not reach out via teams.  [facility] [place] [phone]  J pager: [phone]  [facility] pager: [phone]
Orthopedics Post-Op Check:  Patient was seen and examined at bedside. Denies CP/SOB/Dizziness/N/V/D/HA. Pain is well controlled at the moment.    Vital Signs Last 24 Hrs  T(C): 36.4 (18 Jun 2025 16:00), Max: 36.8 (18 Jun 2025 15:15)  T(F): 97.6 (18 Jun 2025 16:00), Max: 98.2 (18 Jun 2025 15:15)  HR: 91 (18 Jun 2025 16:45) (80 - 91)  BP: 116/64 (18 Jun 2025 16:45) (105/56 - 131/58)  BP(mean): 80 (18 Jun 2025 16:45) (69 - 85)  RR: 15 (18 Jun 2025 16:45) (14 - 19)  SpO2: 99% (18 Jun 2025 16:45) (95% - 100%)    Parameters below as of 18 Jun 2025 16:45  Patient On (Oxygen Delivery Method): room air          Physical Exam:  Gen: NAD  R LE:   Dressing C/D/I  Motor intact + EHL/FHL/TA/GS. Sensation is grossly intact.   Compartments are soft, extremities are warm, DP 2+

## 2025-06-26 LAB — SURGICAL PATHOLOGY STUDY: SIGNIFICANT CHANGE UP

## 2025-07-03 ENCOUNTER — APPOINTMENT (OUTPATIENT)
Dept: ORTHOPEDIC SURGERY | Facility: CLINIC | Age: 70
End: 2025-07-03
Payer: MEDICARE

## 2025-07-03 VITALS — BODY MASS INDEX: 26.46 KG/M2 | WEIGHT: 155 LBS | HEIGHT: 64 IN

## 2025-07-03 PROCEDURE — 99024 POSTOP FOLLOW-UP VISIT: CPT

## 2025-07-03 PROCEDURE — 73562 X-RAY EXAM OF KNEE 3: CPT | Mod: RT

## 2025-07-03 RX ORDER — CELECOXIB 100 MG/1
100 CAPSULE ORAL TWICE DAILY
Qty: 20 | Refills: 0 | Status: ACTIVE | COMMUNITY
Start: 2025-07-03 | End: 1900-01-01

## 2025-07-30 ENCOUNTER — NON-APPOINTMENT (OUTPATIENT)
Age: 70
End: 2025-07-30

## 2025-09-01 ENCOUNTER — NON-APPOINTMENT (OUTPATIENT)
Age: 70
End: 2025-09-01

## (undated) DEVICE — SUT QUILL MONODERM 3-0 30CM 19MM

## (undated) DEVICE — MAKO DRAPE KIT

## (undated) DEVICE — SUT PDO 2 1/2 CIRCLE 40MM NDL 45CM

## (undated) DEVICE — DRAPE IOBAN 33" X 23"

## (undated) DEVICE — SOL IRR POUR H2O 1500ML

## (undated) DEVICE — DRSG STOCKINETTE IMPERVIOUS XL 12 X 48"

## (undated) DEVICE — LAP PAD W RING 18 X 18"

## (undated) DEVICE — DRSG COBAN 6"

## (undated) DEVICE — SAW BLADE STRYKER SAGITTAL 3 HOLE OSCILLATING

## (undated) DEVICE — GOWN SMARTGOWN RAGLAN XLG

## (undated) DEVICE — MAKO VIZADISC KNEE TRACKING KIT

## (undated) DEVICE — ELCTR GROUNDING PAD ADULT COVIDIEN

## (undated) DEVICE — GLV 8 PROTEXIS (CREAM) MICRO

## (undated) DEVICE — NDL HYPO SAFE 22G X 1.5" (BLACK)

## (undated) DEVICE — DRAPE EXTREMITY 87" X 106" X 128"

## (undated) DEVICE — VENODYNE/SCD SLEEVE CALF MEDIUM

## (undated) DEVICE — SYR LUER LOK 20CC

## (undated) DEVICE — HOOD T5 PEELAWAY

## (undated) DEVICE — DRSG DERMABOND 0.7ML

## (undated) DEVICE — SOL IRR BAG NS 0.9% 3000ML

## (undated) DEVICE — TUBING SUCTION NONCONDUCTIVE 6MM X 12FT

## (undated) DEVICE — DRSG AQUACEL 3.5 X 10"

## (undated) DEVICE — DRAPE IOBAN 23" X 23"

## (undated) DEVICE — ELCTR PLASMA BLADE X 3.0S WIDE TIP

## (undated) DEVICE — PREP CHLORAPREP HI-LITE ORANGE 26ML

## (undated) DEVICE — SUCTION YANKAUER NO CONTROL VENT

## (undated) DEVICE — PACK LIJ BASIC ORTHO

## (undated) DEVICE — TAPE SILK 3"

## (undated) DEVICE — GOWN XXL

## (undated) DEVICE — DRSG COBAN 4"

## (undated) DEVICE — DRSG KLING 4"

## (undated) DEVICE — MAKO BLADE NARROW

## (undated) DEVICE — DRAPE LARGE SHEET 72X85"

## (undated) DEVICE — POSITIONER STRAP ARMBOARD VELCRO TS-30

## (undated) DEVICE — SUT QUILL PDO 0 45CM 36MM

## (undated) DEVICE — WARMING BLANKET FULL ADULT

## (undated) DEVICE — SUT VICRYL 1 36" CTX UNDYED

## (undated) DEVICE — POSITIONER CARDIAC BUMP

## (undated) DEVICE — SUT VICRYL 2-0 27" PS-2 UNDYED

## (undated) DEVICE — LABELS BLANK W PEN

## (undated) DEVICE — DRAPE 3/4 SHEET 52X76"

## (undated) DEVICE — SOL IRR POUR NS 0.9% 1500ML

## (undated) DEVICE — TOURNIQUET CUFF 34" DUAL PORT W PLC

## (undated) DEVICE — WOUND IRR IRRISEPT W 0.5 CHG